# Patient Record
Sex: FEMALE | Race: WHITE | Employment: UNEMPLOYED | URBAN - METROPOLITAN AREA
[De-identification: names, ages, dates, MRNs, and addresses within clinical notes are randomized per-mention and may not be internally consistent; named-entity substitution may affect disease eponyms.]

---

## 2024-02-09 ENCOUNTER — HOSPITAL ENCOUNTER (INPATIENT)
Facility: HOSPITAL | Age: 29
LOS: 6 days | Discharge: PSYCHIATRIC OR SUBSTANCE USE FACILITY | DRG: 753 | End: 2024-02-15
Attending: PSYCHIATRY & NEUROLOGY | Admitting: PSYCHIATRY & NEUROLOGY
Payer: MEDICAID

## 2024-02-09 PROBLEM — F33.9 MAJOR DEPRESSION, RECURRENT (HCC): Status: ACTIVE | Noted: 2024-02-09

## 2024-02-09 PROBLEM — F39 UNSPECIFIED MOOD (AFFECTIVE) DISORDER (HCC): Status: ACTIVE | Noted: 2024-02-09

## 2024-02-09 PROCEDURE — 6370000000 HC RX 637 (ALT 250 FOR IP): Performed by: PSYCHIATRY & NEUROLOGY

## 2024-02-09 PROCEDURE — 1240000000 HC EMOTIONAL WELLNESS R&B

## 2024-02-09 RX ORDER — ACETAMINOPHEN 325 MG/1
650 TABLET ORAL EVERY 4 HOURS PRN
Status: DISCONTINUED | OUTPATIENT
Start: 2024-02-09 | End: 2024-02-15 | Stop reason: HOSPADM

## 2024-02-09 RX ORDER — TRAZODONE HYDROCHLORIDE 50 MG/1
50 TABLET ORAL NIGHTLY PRN
Status: DISCONTINUED | OUTPATIENT
Start: 2024-02-09 | End: 2024-02-15 | Stop reason: HOSPADM

## 2024-02-09 RX ORDER — ZIPRASIDONE MESYLATE 20 MG/ML
20 INJECTION, POWDER, LYOPHILIZED, FOR SOLUTION INTRAMUSCULAR EVERY 12 HOURS PRN
Status: DISCONTINUED | OUTPATIENT
Start: 2024-02-09 | End: 2024-02-15 | Stop reason: HOSPADM

## 2024-02-09 RX ORDER — LORAZEPAM 1 MG/1
1 TABLET ORAL EVERY 6 HOURS PRN
Status: DISCONTINUED | OUTPATIENT
Start: 2024-02-09 | End: 2024-02-09

## 2024-02-09 RX ORDER — HYDROXYZINE 50 MG/1
50 TABLET, FILM COATED ORAL 3 TIMES DAILY PRN
Status: DISCONTINUED | OUTPATIENT
Start: 2024-02-09 | End: 2024-02-15 | Stop reason: HOSPADM

## 2024-02-09 RX ORDER — LORAZEPAM 2 MG/ML
1 INJECTION INTRAMUSCULAR EVERY 6 HOURS PRN
Status: DISCONTINUED | OUTPATIENT
Start: 2024-02-09 | End: 2024-02-15 | Stop reason: HOSPADM

## 2024-02-09 RX ORDER — NICOTINE 21 MG/24HR
1 PATCH, TRANSDERMAL 24 HOURS TRANSDERMAL DAILY
Status: DISCONTINUED | OUTPATIENT
Start: 2024-02-09 | End: 2024-02-15 | Stop reason: HOSPADM

## 2024-02-09 RX ORDER — QUETIAPINE FUMARATE 50 MG/1
150 TABLET, EXTENDED RELEASE ORAL EVERY EVENING
Status: DISCONTINUED | OUTPATIENT
Start: 2024-02-09 | End: 2024-02-12

## 2024-02-09 RX ORDER — ZIPRASIDONE HYDROCHLORIDE 20 MG/1
20 CAPSULE ORAL 2 TIMES DAILY PRN
Status: DISCONTINUED | OUTPATIENT
Start: 2024-02-09 | End: 2024-02-15 | Stop reason: HOSPADM

## 2024-02-09 RX ADMIN — LORAZEPAM 1 MG: 1 TABLET ORAL at 11:07

## 2024-02-09 RX ADMIN — CARIPRAZINE 3 MG: 3 CAPSULE, GELATIN COATED ORAL at 16:38

## 2024-02-09 RX ADMIN — QUETIAPINE FUMARATE 150 MG: 50 TABLET, EXTENDED RELEASE ORAL at 17:05

## 2024-02-09 NOTE — GROUP NOTE
Group Therapy Note    Date: 2/9/2024    Group Start Time: 1315  Group End Time: 1400  Group Topic: Process Group - Inpatient    [facility]R 2 [facility]    Radha Cordoba        Group Therapy Note  Writer tried to use the anxiety ball but had to change the topic due to the acuity of the pts. Writer explained the rules but pts were not able to process and follow the instructions. Writer changed the topic of the group the the process animal activity. Pts were asked to describe their strengths and weakness compared to an animal.   Attendees: 2-5         Notes:  Pt was encouraged to attend and chose not to       Signature:  Radha Cordoba

## 2024-02-09 NOTE — GROUP NOTE
Group Therapy Note    Date: 2/9/2024    Group Start Time: 1600  Group End Time: 1640  Group Topic: Recreational    SSR 2  NON ACUTE    Cesilia Peoples        Group Therapy Note    Facilitated leisure skills group to reinforce positive coping and to manage mood through music, social interaction, group activities and art task       Attendees: 3/6       Patient's Goal:  Attend group daily     Notes:  Pt was receptive to listening to music and songs she selected. Interacted with peer and staff. Accepted journal and was noted to be writing in it for a few minutes. Pt was in and out of group       Status After Intervention:  Unchanged    Participation Level: Active Listener and Interactive    Participation Quality: Appropriate      Speech: Loud at times      Thought Process/Content: Religiously Preoccupied      Affective Functioning: Congruent      Mood: euphoric      Level of consciousness:  Alert      Response to Learning: Progressing to goal      Endings: None Reported    Modes of Intervention: Socialization and Activity      Discipline Responsible: Recreational Therapist      Signature:  Cesilia Peoples, CTRS

## 2024-02-09 NOTE — GROUP NOTE
Group Therapy Note    Date: 2/9/2024    Group Start Time: 1115  Group End Time: 1150  Group Topic: Education Group - Inpatient    SSR 2  NON ACUTE    Cesilia Peoples        Group Therapy Note    Facilitated discussion on stress exploration focused on being able to identify daily hassles, major life changes and life circumstances that contribute to stress and identify daily uplifts, healthy coping strategies and protective factors that counteract stress       Attendees: 2/6      Notes:  Encouraged but did not attend    Discipline Responsible: Recreational Therapist      Signature:  Cesilia Peoples, RIGOBERTOS

## 2024-02-10 LAB
GLUCOSE BLD STRIP.AUTO-MCNC: 102 MG/DL (ref 65–100)
PERFORMED BY:: ABNORMAL

## 2024-02-10 PROCEDURE — 6370000000 HC RX 637 (ALT 250 FOR IP): Performed by: PSYCHIATRY & NEUROLOGY

## 2024-02-10 PROCEDURE — 1240000000 HC EMOTIONAL WELLNESS R&B

## 2024-02-10 PROCEDURE — 82962 GLUCOSE BLOOD TEST: CPT

## 2024-02-10 RX ORDER — ATORVASTATIN CALCIUM 10 MG/1
10 TABLET, FILM COATED ORAL NIGHTLY
Status: DISCONTINUED | OUTPATIENT
Start: 2024-02-10 | End: 2024-02-15 | Stop reason: HOSPADM

## 2024-02-10 RX ADMIN — QUETIAPINE FUMARATE 150 MG: 50 TABLET, EXTENDED RELEASE ORAL at 17:29

## 2024-02-10 RX ADMIN — CARIPRAZINE 3 MG: 3 CAPSULE, GELATIN COATED ORAL at 09:38

## 2024-02-10 RX ADMIN — METFORMIN HYDROCHLORIDE 850 MG: 850 TABLET ORAL at 17:29

## 2024-02-10 RX ADMIN — SERTRALINE HYDROCHLORIDE 100 MG: 50 TABLET ORAL at 21:18

## 2024-02-10 RX ADMIN — ATORVASTATIN CALCIUM 10 MG: 10 TABLET, FILM COATED ORAL at 21:18

## 2024-02-10 RX ADMIN — SERTRALINE HYDROCHLORIDE 100 MG: 50 TABLET ORAL at 13:34

## 2024-02-10 NOTE — GROUP NOTE
Group Therapy Note    Date: 2/10/2024    Group Start Time: 1520  Group End Time: 1615  Group Topic: Recreational    SSR 2  NON ACUTE    eCsilia Peoples        Group Therapy Note    Facilitated leisure skills group to reinforce positive coping and to manage mood through music, social interaction, group activities and art task       Attendees: 3/9       Patient's Goal:  Attend group daily     Notes:  Pt was in and out of group. Receptive to listening to music and songs she selected while working on leisure task for a few minutes. Interacted with peers and staff       Status After Intervention:  Improved    Participation Level: Active Listener and Interactive    Participation Quality: Appropriate and Attentive      Speech:  normal      Thought Process/Content: Logical      Affective Functioning: Blunted      Mood:  Calm      Level of consciousness:  Attentive      Response to Learning: Progressing to goal      Endings: None Reported    Modes of Intervention: Socialization and Activity      Discipline Responsible: Recreational Therapist      Signature:  SHELDON Thurman

## 2024-02-10 NOTE — GROUP NOTE
Group Therapy Note    Date: 2/10/2024    Group Start Time: 1115  Group End Time: 1155  Group Topic: Education Group - Inpatient    SSR 2  NON ACUTE    Cesilia Peoples        Group Therapy Note    Facilitated discussion focus on identifying different barriers that has prevented progress and identifying ways to confront them       Attendees: 2/9       Patient's Goal:  Attend group daily     Notes:   Receptive to information discussed on different barriers and was able to share \"substance abuse, regret and unresolved grief\" as her biggest barriers and shared they prevent her from \"concentrating and keeping healthy relationship\"  Pt shared she can confront these barriers by \"smoking weed and vaping\" Pt was encouraged to engage in positive coping skills. Pt left group. Pt stated \"she wanted to go lay down for a few minutes\" Pt came back after group was over        Status After Intervention:  Improved    Participation Level: Active Listener and Interactive    Participation Quality: Appropriate, Attentive, and Sharing      Speech:  normal      Thought Process/Content: Logical      Affective Functioning: Blunted      Mood:  Calm      Level of consciousness:  Attentive      Response to Learning: Able to verbalize current knowledge/experience and Progressing to goal      Endings: None Reported    Modes of Intervention: Education and Support      Discipline Responsible: Registered Nurse      Signature:  SHELDON Thurman

## 2024-02-11 LAB
GLUCOSE BLD STRIP.AUTO-MCNC: 107 MG/DL (ref 65–100)
PERFORMED BY:: ABNORMAL

## 2024-02-11 PROCEDURE — 6370000000 HC RX 637 (ALT 250 FOR IP): Performed by: PSYCHIATRY & NEUROLOGY

## 2024-02-11 PROCEDURE — 1240000000 HC EMOTIONAL WELLNESS R&B

## 2024-02-11 PROCEDURE — 82962 GLUCOSE BLOOD TEST: CPT

## 2024-02-11 RX ADMIN — SERTRALINE HYDROCHLORIDE 100 MG: 50 TABLET ORAL at 08:24

## 2024-02-11 RX ADMIN — SERTRALINE HYDROCHLORIDE 100 MG: 50 TABLET ORAL at 20:52

## 2024-02-11 RX ADMIN — CARIPRAZINE 3 MG: 3 CAPSULE, GELATIN COATED ORAL at 08:24

## 2024-02-11 RX ADMIN — ZIPRASIDONE HYDROCHLORIDE 20 MG: 20 CAPSULE ORAL at 08:48

## 2024-02-11 RX ADMIN — METFORMIN HYDROCHLORIDE 850 MG: 850 TABLET ORAL at 18:35

## 2024-02-11 RX ADMIN — METFORMIN HYDROCHLORIDE 850 MG: 850 TABLET ORAL at 08:23

## 2024-02-11 RX ADMIN — HYDROXYZINE HYDROCHLORIDE 50 MG: 50 TABLET, FILM COATED ORAL at 08:48

## 2024-02-11 RX ADMIN — QUETIAPINE FUMARATE 150 MG: 50 TABLET, EXTENDED RELEASE ORAL at 18:35

## 2024-02-11 RX ADMIN — ATORVASTATIN CALCIUM 10 MG: 10 TABLET, FILM COATED ORAL at 20:52

## 2024-02-11 NOTE — GROUP NOTE
Group Therapy Note    Date: 2/11/2024    Group Start Time: 1123  Group End Time: 1200  Group Topic: Education Group - Inpatient    SSR 2  NON ACUTE    Cesilia Peoples        Group Therapy Note    Facilitated group to focus on understanding the importance of healthy boundaries and developing healthy boundaries to help improve relationships       Attendees: 2/9       Notes:  Did not attend. Pt was asleep    Discipline Responsible: Recreational Therapist      Signature:  SHELDON Thurman

## 2024-02-11 NOTE — CONSULTS
Consult    Subjective:     Patient is a 28 y.o. year old female past medical history of diabetes hyperlipidemia PTSD admitted to psychiatric floor because of mood disorder, patient denies any chest pain shortness of breath nausea vomiting diarrhea constipation patient on Lipitor 10 mg daily for hyperlipidemia and also take metformin 850 twice a day    Patient denies any chest pain shortness of nausea vomiting diarrhea constipation no fever no chills    History reviewed. No pertinent past medical history.   History reviewed. No pertinent surgical history.  History reviewed. No pertinent family history.   Social History     Tobacco Use    Smoking status: Every Day     Types: Cigarettes    Smokeless tobacco: Not on file   Substance Use Topics    Alcohol use: Not on file       Current Facility-Administered Medications   Medication Dose Route Frequency Provider Last Rate Last Admin    sertraline (ZOLOFT) tablet 100 mg  100 mg Oral BID Kurt Velasco MD   100 mg at 02/11/24 0824    metFORMIN (GLUCOPHAGE) tablet 850 mg  850 mg Oral BID WC Kurt Velasco MD   850 mg at 02/11/24 0823    atorvastatin (LIPITOR) tablet 10 mg  10 mg Oral Nightly Kurt Velasco MD   10 mg at 02/10/24 2118    acetaminophen (TYLENOL) tablet 650 mg  650 mg Oral Q4H PRN Denise Wright MD        hydrOXYzine HCl (ATARAX) tablet 50 mg  50 mg Oral TID PRN Denise Wright MD   50 mg at 02/11/24 0848    traZODone (DESYREL) tablet 50 mg  50 mg Oral Nightly PRN Denise Wright MD        magnesium hydroxide (MILK OF MAGNESIA) 400 MG/5ML suspension 30 mL  30 mL Oral Daily PRN Denise Wright MD        LORazepam (ATIVAN) injection 1 mg  1 mg IntraMUSCular Q6H PRN Denise Wright MD        ziprasidone (GEODON) capsule 20 mg  20 mg Oral BID PRN Denise Wright MD   20 mg at 02/11/24 0848    ziprasidone (GEODON) injection 20 mg  20 mg IntraMUSCular Q12H PRN Denise Wright MD        nicotine (NICODERM CQ) 21 MG/24HR 1 patch  1 patch TransDERmal Daily Denise Wright MD   1 patch at

## 2024-02-11 NOTE — GROUP NOTE
Group Therapy Note    Date: 2/11/2024    Group Start Time: 1505  Group End Time: 1555  Group Topic: Recreational    SSR 2 [facility] NON ACUTE    Cesilia Peopels        Group Therapy Note    Facilitated leisure skills group to reinforce positive coping and to manage mood through music, social interaction, group activities and art task       Attendees: 5/9      Notes:  Encouraged but did not attend    Discipline Responsible: Recreational Therapist      Signature:  SHELDON Thurman

## 2024-02-11 NOTE — PLAN OF CARE
Problem: Discharge Planning  Goal: Discharge to home or other facility with appropriate resources  Outcome: Not Progressing     Problem: Coping  Goal: Pt/Family able to verbalize concerns and demonstrate effective coping strategies  Description: INTERVENTIONS:  1. Assist patient/family to identify coping skills, available support systems and cultural and spiritual values  2. Provide emotional support, including active listening and acknowledgement of concerns of patient and caregivers  3. Reduce environmental stimuli, as able  4. Instruct patient/family in relaxation techniques, as appropriate  5. Assess for spiritual pain/suffering and initiate Spiritual Care, Psychosocial Clinical Specialist consults as needed  Outcome: Not Progressing     Problem: Decision Making  Goal: Pt/Family able to effectively weigh alternatives and participate in decision making related to treatment and care  Description: INTERVENTIONS:  1. Determine when there are differences between patient's view, family's view, and healthcare provider's view of condition  2. Facilitate patient and family articulation of goals for care  3. Help patient and family identify pros/cons of alternative solutions  4. Provide information as requested by patient/family  5. Respect patient/family right to receive or not to receive information  6. Serve as a liaison between patient and family and health care team  7. Initiate Consults from Ethics, Palliative Care or initiate Family Care Conference as is appropriate  Outcome: Not Progressing     Problem: Depression/Self Harm  Goal: Effect of psychiatric condition will be minimized and patient will be protected from self harm  Description: INTERVENTIONS:  1. Assess impact of patient's symptoms on level of functioning, self care needs and offer support as indicated  2. Assess patient/family knowledge of depression, impact on illness and need for teaching  3. Provide emotional support, presence and reassurance  4.

## 2024-02-12 LAB
GLUCOSE BLD STRIP.AUTO-MCNC: 97 MG/DL (ref 65–100)
PERFORMED BY:: NORMAL

## 2024-02-12 PROCEDURE — 6370000000 HC RX 637 (ALT 250 FOR IP): Performed by: PSYCHIATRY & NEUROLOGY

## 2024-02-12 PROCEDURE — 82962 GLUCOSE BLOOD TEST: CPT

## 2024-02-12 PROCEDURE — 1240000000 HC EMOTIONAL WELLNESS R&B

## 2024-02-12 RX ORDER — ARIPIPRAZOLE 2 MG/1
2 TABLET ORAL ONCE
Status: COMPLETED | OUTPATIENT
Start: 2024-02-12 | End: 2024-02-12

## 2024-02-12 RX ORDER — QUETIAPINE FUMARATE 50 MG/1
200 TABLET, EXTENDED RELEASE ORAL EVERY EVENING
Status: DISCONTINUED | OUTPATIENT
Start: 2024-02-13 | End: 2024-02-14

## 2024-02-12 RX ADMIN — METFORMIN HYDROCHLORIDE 850 MG: 850 TABLET ORAL at 17:13

## 2024-02-12 RX ADMIN — ATORVASTATIN CALCIUM 10 MG: 10 TABLET, FILM COATED ORAL at 21:01

## 2024-02-12 RX ADMIN — TRAZODONE HYDROCHLORIDE 50 MG: 50 TABLET ORAL at 21:00

## 2024-02-12 RX ADMIN — SERTRALINE HYDROCHLORIDE 100 MG: 50 TABLET ORAL at 08:24

## 2024-02-12 RX ADMIN — ARIPIPRAZOLE 2 MG: 2 TABLET ORAL at 22:57

## 2024-02-12 RX ADMIN — QUETIAPINE FUMARATE 150 MG: 50 TABLET, EXTENDED RELEASE ORAL at 17:13

## 2024-02-12 RX ADMIN — METFORMIN HYDROCHLORIDE 850 MG: 850 TABLET ORAL at 08:24

## 2024-02-12 RX ADMIN — HYDROXYZINE HYDROCHLORIDE 50 MG: 50 TABLET, FILM COATED ORAL at 21:00

## 2024-02-12 RX ADMIN — CARIPRAZINE 3 MG: 3 CAPSULE, GELATIN COATED ORAL at 09:12

## 2024-02-12 RX ADMIN — SERTRALINE HYDROCHLORIDE 100 MG: 50 TABLET ORAL at 21:01

## 2024-02-12 NOTE — GROUP NOTE
Group Therapy Note    Date: 2/12/2024    Group Start Time: 1300  Group End Time: 1335  Group Topic: Process Group - Inpatient    SSR 2 [facility]    Joellen Bradshaw        Group Therapy Note: This writer facilitated a group where the topic of \"self care\" was discussed.     Attendees: 2       Patient's Goal:  to attend groups    Notes:  Pt was able to identify that going out to get a manicure and pedicure is how she maintains self care.     Status After Intervention:  Improved    Participation Level: Active Listener and Interactive    Participation Quality: Appropriate, Attentive, and Sharing      Speech:  normal      Thought Process/Content: Logical      Affective Functioning: Congruent      Mood: calm      Level of consciousness:  Alert and Oriented x4      Response to Learning: Able to verbalize current knowledge/experience, Able to verbalize/acknowledge new learning, Able to retain information, Capable of insight, and Able to change behavior      Endings: None Reported    Modes of Intervention: Education and Support      Discipline Responsible: /Counselor        Signature:  Joellen Bradshaw

## 2024-02-12 NOTE — GROUP NOTE
Group Therapy Note    Date: 2/12/2024    Group Start Time: 1115  Group End Time: 1200  Group Topic: Process Group - Inpatient    [facility] 2 [facility]    Radha Cordoba        Group Therapy Note  Process group was focused on substance use. Writer was going to speak about family relationships but pts shared that substance use was a common factor they were facing. Pts interacted well with one another supporting each other. Pt provided positive feedback and challenge their thoughts and encouraged them to not be hard on themselves.   Attendees: 4-8         Notes:  Pt was on the Acute unit during group         Signature:  Radha Cordoba

## 2024-02-12 NOTE — GROUP NOTE
Group Therapy Note    Date: 2/12/2024    Group Start Time: 1125  Group End Time: 1155  Group Topic: Education Group - Inpatient    SSR 2  NON ACUTE    Cesilia Peoples        Group Therapy Note    Facilitated group to introduce the definition of self-esteem and discuss information relating to creating steps to greater self-appreciation and recognizing symptoms of self-defeat       Attendees: 4/10       Patient's Goal:  Client will learn and demonstrate effective coping skills    Notes:  Receptive to information discussed and engaged. Pt was able to recognize symptoms and identified some personal symptoms of self-defeat and was able to share positive ways to boost her self-esteem     Status After Intervention:  Improved    Participation Level: Active Listener and Interactive    Participation Quality: Appropriate, Attentive, and Sharing      Speech:  normal      Thought Process/Content: Logical      Affective Functioning: Congruent      Mood:  Calm      Level of consciousness:  Attentive      Response to Learning: Able to verbalize current knowledge/experience and Progressing to goal      Endings: None Reported    Modes of Intervention: Education and Support      Discipline Responsible: Recreational Therapist      Signature:  SHELDON Thurman

## 2024-02-12 NOTE — CARE COORDINATION
02/12/24 1415   ITP   Date of Plan 02/12/24   Date of Next Review 02/19/24   Primary Diagnosis Code Unspecified mood (affective) disorder (HCC) F39   Barriers to Treatment Need for psychoeducation   Strengths Incorporated in Plan Acknowledging need for assistance   Plan of Care   Long Term Goal (LTG) Stated in patient/guardian terms \"not to end up like my mom\"   Short Term Goal 1   Short Term Goal 1 Client will be oriented to program and staff, and participate in assessment process   Baseline Functioning to make the individual comfortable with the environment   Target the individual will be able to approach staff and voice appropriate needs   Objectives Client will participate in individual therapy;Client will participate in group therapy   Intervention 1 Assess safety   Frequency daily   Measured by Self report;Staff observation   Staff Responsible Clinical staff;[facility] staff   Intervention 2 Acknowledge client strengths   Frequency daily   Measured by Staff observation;Self report   Staff Responsible Clinical staff;[facility] staff   Intervention 3 Group therapy   Frequency daily   Measured by Staff observation;Self report   Staff Responsible Clinical staff;[facility] staff   STG Goal 1 Status: Patient Appears to be  Progressing toward treatment plan goal   Short Term Goal 2   Short Term Goal 2 Client will learn and demonstrate effective coping skills   Baseline Functioning to improve the over all quality of life   Target the individual will be able to use positive skills to deal with life stressors   Objectives Client will participate in group therapy;Client will participate in individual therapy   Intervention 1 Indvidual therapy   Frequency daily   Measured by Self report;Staff observation   Staff Responsible Clinical staff;[facility] staff   Intervention 2 Milieu therapy and support   Frequency daily   Measured by Staff observation;Self report   Staff Responsible Clinical staff;[facility] staff   Intervention 3 Monitor medications

## 2024-02-12 NOTE — GROUP NOTE
Group Therapy Note    Date: 2/12/2024    Group Start Time: 1555  Group End Time: 1645  Group Topic: Recreational    SSR 2  NON ACUTE    Cesilia Peoples        Group Therapy Note    Facilitated leisure skills group to reinforce positive coping and to manage mood through music, social interaction, group activities and art task       Attendees: 6/11       Patient's Goal:  Client will learn and demonstrate effective coping skills    Notes:  Pt was receptive to listening to music and songs she selected. Interacted with peer and staff. Declined to work on leisure task      Status After Intervention:  Improved    Participation Level: Active Listener and Interactive    Participation Quality: Appropriate      Speech:  normal      Thought Process/Content: Logical      Affective Functioning: Congruent      Mood:  Calm      Level of consciousness:  Alert      Response to Learning: Progressing to goal      Endings: None Reported    Modes of Intervention: Socialization and Activity      Discipline Responsible: Recreational Therapist      Signature:  SHELDON Thurman

## 2024-02-13 LAB
GLUCOSE BLD STRIP.AUTO-MCNC: 89 MG/DL (ref 65–100)
PERFORMED BY:: NORMAL

## 2024-02-13 PROCEDURE — 6370000000 HC RX 637 (ALT 250 FOR IP): Performed by: PSYCHIATRY & NEUROLOGY

## 2024-02-13 PROCEDURE — 1240000000 HC EMOTIONAL WELLNESS R&B

## 2024-02-13 PROCEDURE — 82962 GLUCOSE BLOOD TEST: CPT

## 2024-02-13 RX ORDER — MAGNESIUM HYDROXIDE/ALUMINUM HYDROXICE/SIMETHICONE 120; 1200; 1200 MG/30ML; MG/30ML; MG/30ML
30 SUSPENSION ORAL EVERY 6 HOURS PRN
Status: DISCONTINUED | OUTPATIENT
Start: 2024-02-13 | End: 2024-02-15 | Stop reason: HOSPADM

## 2024-02-13 RX ADMIN — TRAZODONE HYDROCHLORIDE 50 MG: 50 TABLET ORAL at 20:58

## 2024-02-13 RX ADMIN — SERTRALINE HYDROCHLORIDE 100 MG: 50 TABLET ORAL at 08:04

## 2024-02-13 RX ADMIN — ATORVASTATIN CALCIUM 10 MG: 10 TABLET, FILM COATED ORAL at 20:57

## 2024-02-13 RX ADMIN — CARIPRAZINE 3 MG: 3 CAPSULE, GELATIN COATED ORAL at 08:11

## 2024-02-13 RX ADMIN — QUETIAPINE FUMARATE 200 MG: 50 TABLET, EXTENDED RELEASE ORAL at 17:05

## 2024-02-13 RX ADMIN — SERTRALINE HYDROCHLORIDE 100 MG: 50 TABLET ORAL at 20:58

## 2024-02-13 RX ADMIN — METFORMIN HYDROCHLORIDE 850 MG: 850 TABLET ORAL at 08:04

## 2024-02-13 RX ADMIN — METFORMIN HYDROCHLORIDE 850 MG: 850 TABLET ORAL at 17:05

## 2024-02-13 RX ADMIN — HYDROXYZINE HYDROCHLORIDE 50 MG: 50 TABLET, FILM COATED ORAL at 20:58

## 2024-02-13 NOTE — GROUP NOTE
Group Therapy Note    Date: 2/13/2024    Group Start Time: 1315  Group End Time: 1400  Group Topic: Process Group - Inpatient    SSR 2 BE[facility] ACUTE    Radha Cordoba        Group Therapy Note  Process group was focused on relationships. Writer had the pts pick a number [phone] and asked various questions about relationships. Writer asked the pts to describe their life using a movie. Pts interacted well with one another and provided positive feedback to others.   Attendees: 5-7       Patient's Goal:  \"to get out of here\"    Notes:  Pt interacted well with others and at times would get off topic. Pts thoughts were tangential and disorganized at times. Writer was able to redirected easily and was pleasant when asked to let others speak as well. Pts movie would be called \"the black sheep of the family\" because she feels like the black sheep with problems.     Status After Intervention:  Improved    Participation Level: Active Listener and Interactive    Participation Quality: Appropriate, Attentive, and Sharing      Speech:  normal      Thought Process/Content: Flight of ideas      Affective Functioning: Congruent      Mood:  depressed       Level of consciousness:  Alert, Oriented x4, and Attentive      Response to Learning: Able to verbalize current knowledge/experience, Able to change behavior, and Progressing to goal      Endings: None Reported    Modes of Intervention: Support, Exploration, Problem-solving, and Limit-setting      Discipline Responsible: /Counselor      Signature:  Radha Cordoba

## 2024-02-14 PROCEDURE — 6360000002 HC RX W HCPCS

## 2024-02-14 PROCEDURE — 6360000002 HC RX W HCPCS: Performed by: PSYCHIATRY & NEUROLOGY

## 2024-02-14 PROCEDURE — 6370000000 HC RX 637 (ALT 250 FOR IP): Performed by: PSYCHIATRY & NEUROLOGY

## 2024-02-14 PROCEDURE — 1240000000 HC EMOTIONAL WELLNESS R&B

## 2024-02-14 RX ORDER — QUETIAPINE 150 MG/1
300 TABLET, FILM COATED, EXTENDED RELEASE ORAL EVERY EVENING
Status: DISCONTINUED | OUTPATIENT
Start: 2024-02-14 | End: 2024-02-15 | Stop reason: HOSPADM

## 2024-02-14 RX ADMIN — ATORVASTATIN CALCIUM 10 MG: 10 TABLET, FILM COATED ORAL at 20:34

## 2024-02-14 RX ADMIN — SERTRALINE HYDROCHLORIDE 100 MG: 50 TABLET ORAL at 20:34

## 2024-02-14 RX ADMIN — ARIPIPRAZOLE 400 MG: KIT at 09:13

## 2024-02-14 RX ADMIN — TRAZODONE HYDROCHLORIDE 50 MG: 50 TABLET ORAL at 20:34

## 2024-02-14 RX ADMIN — SERTRALINE HYDROCHLORIDE 100 MG: 50 TABLET ORAL at 08:12

## 2024-02-14 RX ADMIN — HYDROXYZINE HYDROCHLORIDE 50 MG: 50 TABLET, FILM COATED ORAL at 20:34

## 2024-02-14 RX ADMIN — QUETIAPINE FUMARATE 300 MG: 150 TABLET, EXTENDED RELEASE ORAL at 18:00

## 2024-02-14 RX ADMIN — CARIPRAZINE 3 MG: 3 CAPSULE, GELATIN COATED ORAL at 08:23

## 2024-02-14 RX ADMIN — METFORMIN HYDROCHLORIDE 850 MG: 850 TABLET ORAL at 08:12

## 2024-02-14 RX ADMIN — METFORMIN HYDROCHLORIDE 850 MG: 850 TABLET ORAL at 17:06

## 2024-02-14 NOTE — GROUP NOTE
Group Therapy Note    Date: 2/13/2024    Group Start Time: 1845  Group End Time: 1940  Group Topic: Education Group - Inpatient    SSR 2  NON ACUTE    Cady Langston        Group Therapy Note    Attendees: 6/7    Recreational Therapist facilitated structured leisure skills group to introduce healthy leisure skills as positive way to cope and manage mood.         Patient's Goal:  Client will learn and demonstrate effective coping skills     Notes:  Attended group and listened to songs with peers.  Pt was receptive to intervention and responded to prompts from staff.     Status After Intervention:  Improved    Participation Level: Active Listener    Participation Quality: Appropriate      Speech:  normal      Thought Process/Content: Logical      Affective Functioning: Congruent      Mood:  calm       Level of consciousness:  Alert      Response to Learning: Progressing to goal      Endings: None Reported    Modes of Intervention: Activity      Discipline Responsible: Recreational Therapist      Signature:  SHELDON Rodriguez

## 2024-02-14 NOTE — PLAN OF CARE
Problem: Discharge Planning  Goal: Discharge to home or other facility with appropriate resources  Outcome: Not Progressing     Problem: Coping  Goal: Pt/Family able to verbalize concerns and demonstrate effective coping strategies  Description: INTERVENTIONS:  1. Assist patient/family to identify coping skills, available support systems and cultural and spiritual values  2. Provide emotional support, including active listening and acknowledgement of concerns of patient and caregivers  3. Reduce environmental stimuli, as able  4. Instruct patient/family in relaxation techniques, as appropriate  5. Assess for spiritual pain/suffering and initiate Spiritual Care, Psychosocial Clinical Specialist consults as needed  Outcome: Not Progressing     Problem: Decision Making  Goal: Pt/Family able to effectively weigh alternatives and participate in decision making related to treatment and care  Description: INTERVENTIONS:  1. Determine when there are differences between patient's view, family's view, and healthcare provider's view of condition  2. Facilitate patient and family articulation of goals for care  3. Help patient and family identify pros/cons of alternative solutions  4. Provide information as requested by patient/family  5. Respect patient/family right to receive or not to receive information  6. Serve as a liaison between patient and family and health care team  7. Initiate Consults from Ethics, Palliative Care or initiate Family Care Conference as is appropriate  Outcome: Not Progressing     Problem: Confusion  Goal: Confusion, delirium, dementia, or psychosis is improved or at baseline  Description: INTERVENTIONS:  1. Assess for possible contributors to thought disturbance, including medications, impaired vision or hearing, underlying metabolic abnormalities, dehydration, psychiatric diagnoses, and notify attending LIP  2. [place] high risk fall precautions, as indicated  3. Provide frequent short contacts

## 2024-02-14 NOTE — GROUP NOTE
Group Therapy Note    Date: 2/14/2024    Group Start Time: 1100  Group End Time: 1145  Group Topic: Process Group - Inpatient    SSR 2 BEHA [facility] ACUTE    Joellen Bradshaw        Group Therapy Note: This writer facilitated a group where the emotion of anxiety was discussed along with it's triggers and coping skills. After the conversation the \"anxiety ball\" was passed around to discuss the emotion further.    Attendees: 5       Patient's Goal:  to attend groups    Notes:  Pt able to identify that when she does not have support or someone to talk to, she gets anxious. Pt stated \"I like to smoke weed to help me with my anxiety, I mean my whole family does it\"    Status After Intervention:  Improved    Participation Level: Active Listener and Interactive    Participation Quality: Appropriate, Attentive, Sharing, and Supportive      Speech:  normal      Thought Process/Content: Logical  Linear      Affective Functioning: Congruent      Mood: calm      Level of consciousness:  Alert, Oriented x4, and Attentive      Response to Learning: Able to verbalize current knowledge/experience, Able to verbalize/acknowledge new learning, Able to retain information, Capable of insight, and Able to change behavior      Endings: None Reported    Modes of Intervention: Education and Support      Discipline Responsible: /Counselor      Signature:  Joellen Bradshaw

## 2024-02-15 VITALS
HEIGHT: 65 IN | HEART RATE: 91 BPM | TEMPERATURE: 97.9 F | RESPIRATION RATE: 20 BRPM | WEIGHT: 293 LBS | OXYGEN SATURATION: 99 % | DIASTOLIC BLOOD PRESSURE: 77 MMHG | BODY MASS INDEX: 48.82 KG/M2 | SYSTOLIC BLOOD PRESSURE: 144 MMHG

## 2024-02-15 LAB
GLUCOSE BLD STRIP.AUTO-MCNC: 124 MG/DL (ref 65–100)
PERFORMED BY:: ABNORMAL

## 2024-02-15 PROCEDURE — 82962 GLUCOSE BLOOD TEST: CPT

## 2024-02-15 PROCEDURE — 6370000000 HC RX 637 (ALT 250 FOR IP): Performed by: PSYCHIATRY & NEUROLOGY

## 2024-02-15 RX ORDER — QUETIAPINE 300 MG/1
300 TABLET, FILM COATED, EXTENDED RELEASE ORAL EVERY EVENING
Qty: 30 TABLET | Refills: 1 | Status: SHIPPED | OUTPATIENT
Start: 2024-02-15

## 2024-02-15 RX ORDER — SERTRALINE HYDROCHLORIDE 100 MG/1
100 TABLET, FILM COATED ORAL 2 TIMES DAILY
Qty: 30 TABLET | Refills: 1 | Status: SHIPPED | OUTPATIENT
Start: 2024-02-15

## 2024-02-15 RX ORDER — TRAZODONE HYDROCHLORIDE 50 MG/1
50 TABLET ORAL NIGHTLY PRN
Qty: 30 TABLET | Refills: 1 | Status: SHIPPED | OUTPATIENT
Start: 2024-02-15

## 2024-02-15 RX ORDER — ATORVASTATIN CALCIUM 10 MG/1
10 TABLET, FILM COATED ORAL NIGHTLY
Qty: 30 TABLET | Refills: 1 | Status: SHIPPED | OUTPATIENT
Start: 2024-02-15

## 2024-02-15 RX ADMIN — CARIPRAZINE 3 MG: 3 CAPSULE, GELATIN COATED ORAL at 08:32

## 2024-02-15 RX ADMIN — SERTRALINE HYDROCHLORIDE 100 MG: 50 TABLET ORAL at 08:32

## 2024-02-15 RX ADMIN — METFORMIN HYDROCHLORIDE 850 MG: 850 TABLET ORAL at 08:32

## 2024-02-15 NOTE — GROUP NOTE
Group Therapy Note    Date: 2/15/2024    Group Start Time: 1335  Group End Time: 1415  Group Topic: Recreational    SSR 2 [facility] NON ACUTE    Cesilia Peoples        Group Therapy Note    Facilitated leisure skills group to reinforce positive coping and to manage mood through music, social interaction, group activities and art task      Attendees: 2/4      Notes: Did not attend. Was preparing to discharge    Discipline Responsible: Recreational Therapist      Signature:  RIGOBERTO ThurmanS

## 2024-02-15 NOTE — PROGRESS NOTES
responded to spiritual consult. Pt approached  while  was on the unit for a spiritual health visit. Pt is in good spirits, smiles throughout the visit. Pt acknowledges she was pleased with previous spiritual health visit. Pt shared in life review/joyful and painful storytelling. Pt discusses complicated familial and romantic relationships. Pt admits she smiles a lot and believes that laughter is better than crying. Pt expresses her appreciation for the visit.    Please contact Spiritual Health for any emotional/spiritual needs and/or further referrals. Thank you.    Rev. Sabrina Smith MDIV   can be reached by calling the  at [facility]  [phone]     
PSYCHIATRIC PROGRESS NOTE         Patient Name  Zeynep Sparks   Date of Birth 1995   [facility] [id number]   Medical Record Number  [id number]      Age  28 y.o.   PCP None, None   Admit date:  2/9/2024    Room Number  235/01   [facility]   Date of Service  2/10/2024            HISTORY OF PRESENT ILLNESS/INTERVAL HISTORY:              MENTAL STATUS EXAM & VITALS                    VITALS:     Patient Vitals for the past 24 hrs:   Temp Pulse Resp BP SpO2   02/10/24 2107 97.1 °F (36.2 °C) 82 18 120/89 --   02/10/24 0701 97.7 °F (36.5 °C) 82 18 120/89 98 %     Wt Readings from Last 3 Encounters:   02/09/24 136.1 kg (300 lb)     Temp Readings from Last 3 Encounters:   02/10/24 97.1 °F (36.2 °C) (Oral)     BP Readings from Last 3 Encounters:   02/10/24 120/89     Pulse Readings from Last 3 Encounters:   02/10/24 82            DATA     LABORATORY DATA:(reviewed/updated 2/10/2024)  Recent Results (from the past 24 hour(s))   POCT Glucose    Collection Time: 02/10/24  4:25 PM   Result Value Ref Range    POC Glucose 102 (H) 65 - 100 mg/dL    Performed by: Michael Pelayo       No results found for: \"VALAC\", \"VALP\", \"CARB2\"  No results found for: \"LITHM\"   RADIOLOGY REPORTS:(reviewed/updated 2/10/2024)  No results found.       MEDICATIONS     ALL MEDICATIONS:   Current Facility-Administered Medications   Medication Dose Route Frequency   • sertraline (ZOLOFT) tablet 100 mg  100 mg Oral BID   • metFORMIN (GLUCOPHAGE) tablet 850 mg  850 mg Oral BID WC   • atorvastatin (LIPITOR) tablet 10 mg  10 mg Oral Nightly   • acetaminophen (TYLENOL) tablet 650 mg  650 mg Oral Q4H PRN   • hydrOXYzine HCl (ATARAX) tablet 50 mg  50 mg Oral TID PRN   • traZODone (DESYREL) tablet 50 mg  50 mg Oral Nightly PRN   • magnesium hydroxide (MILK OF MAGNESIA) 400 MG/5ML suspension 30 mL  30 mL Oral Daily PRN   • LORazepam (ATIVAN) injection 1 mg  1 mg IntraMUSCular Q6H PRN   • ziprasidone (GEODON) capsule 20 mg  20 mg Oral BID PRN 
PSYCHIATRIC PROGRESS NOTE         Patient Name  Zeynep Sparks   Date of Birth 1995   [facility] [id number]   Medical Record Number  [id number]      Age  28 y.o.   PCP None, None   Admit date:  2/9/2024    Room Number  233/02   [facility]   Date of Service  2/14/2024            HISTORY OF PRESENT ILLNESS/INTERVAL HISTORY:              MENTAL STATUS EXAM & VITALS                    VITALS:     Patient Vitals for the past 24 hrs:   Temp Pulse Resp BP   02/13/24 1912 98.2 °F (36.8 °C) 76 18 108/70   02/13/24 1900 98.2 °F (36.8 °C) 76 18 108/70   02/13/24 0801 97.3 °F (36.3 °C) 82 18 121/75     Wt Readings from Last 3 Encounters:   02/09/24 136.1 kg (300 lb)     Temp Readings from Last 3 Encounters:   02/13/24 98.2 °F (36.8 °C) (Oral)     BP Readings from Last 3 Encounters:   02/13/24 108/70     Pulse Readings from Last 3 Encounters:   02/13/24 76            DATA     LABORATORY DATA:(reviewed/updated 2/14/2024)  Recent Results (from the past 24 hour(s))   POCT Glucose    Collection Time: 02/13/24  6:41 AM   Result Value Ref Range    POC Glucose 89 65 - 100 mg/dL    Performed by: Mike Vuong       No results found for: \"VALAC\", \"VALP\", \"CARB2\"  No results found for: \"LITHM\"   RADIOLOGY REPORTS:(reviewed/updated 2/14/2024)  No results found.       MEDICATIONS     ALL MEDICATIONS:   Current Facility-Administered Medications   Medication Dose Route Frequency    aluminum & magnesium hydroxide-simethicone (MAALOX) 200-200-20 MG/5ML suspension 30 mL  30 mL Oral Q6H PRN    ARIPiprazole ER (ABILIFY MAINTENA) injection 400 mg  400 mg IntraMUSCular Once    QUEtiapine (SEROQUEL XR) extended release tablet 200 mg  200 mg Oral QPM    cariprazine hcl (VRAYLAR) capsule 3 mg  3 mg Oral Daily    sertraline (ZOLOFT) tablet 100 mg  100 mg Oral BID    metFORMIN (GLUCOPHAGE) tablet 850 mg  850 mg Oral BID WC    atorvastatin (LIPITOR) tablet 10 mg  10 mg Oral Nightly    acetaminophen (TYLENOL) tablet 650 mg  650 mg Oral 
PSYCHIATRIC PROGRESS NOTE         Patient Name  Zeynep Sparks   Date of Birth 1995   [facility] [id number]   Medical Record Number  [id number]      Age  28 y.o.   PCP None, None   Admit date:  2/9/2024    Room Number  235/01   [facility]   Date of Service  2/11/2024            HISTORY OF PRESENT ILLNESS/INTERVAL HISTORY:              MENTAL STATUS EXAM & VITALS                    VITALS:     Patient Vitals for the past 24 hrs:   Temp Pulse Resp BP   02/11/24 1908 98.3 °F (36.8 °C) 92 18 136/87   02/11/24 0811 97.9 °F (36.6 °C) 88 18 138/70     Wt Readings from Last 3 Encounters:   02/09/24 136.1 kg (300 lb)     Temp Readings from Last 3 Encounters:   02/11/24 98.3 °F (36.8 °C) (Oral)     BP Readings from Last 3 Encounters:   02/11/24 136/87     Pulse Readings from Last 3 Encounters:   02/11/24 92            DATA     LABORATORY DATA:(reviewed/updated 2/11/2024)  Recent Results (from the past 24 hour(s))   POCT Glucose    Collection Time: 02/11/24  6:34 AM   Result Value Ref Range    POC Glucose 107 (H) 65 - 100 mg/dL    Performed by: FELICIANO PRITCHARD       No results found for: \"VALAC\", \"VALP\", \"CARB2\"  No results found for: \"LITHM\"   RADIOLOGY REPORTS:(reviewed/updated 2/11/2024)  No results found.       MEDICATIONS     ALL MEDICATIONS:   Current Facility-Administered Medications   Medication Dose Route Frequency   • sertraline (ZOLOFT) tablet 100 mg  100 mg Oral BID   • metFORMIN (GLUCOPHAGE) tablet 850 mg  850 mg Oral BID WC   • atorvastatin (LIPITOR) tablet 10 mg  10 mg Oral Nightly   • acetaminophen (TYLENOL) tablet 650 mg  650 mg Oral Q4H PRN   • hydrOXYzine HCl (ATARAX) tablet 50 mg  50 mg Oral TID PRN   • traZODone (DESYREL) tablet 50 mg  50 mg Oral Nightly PRN   • magnesium hydroxide (MILK OF MAGNESIA) 400 MG/5ML suspension 30 mL  30 mL Oral Daily PRN   • LORazepam (ATIVAN) injection 1 mg  1 mg IntraMUSCular Q6H PRN   • ziprasidone (GEODON) capsule 20 mg  20 mg Oral BID PRN   • ziprasidone 
Progress Note  Date:2024       Room:[facility]  Patient Name:Zeynep Sparks     YOB: 1995     Age:28 y.o.        Subjective    Subjective patient still presents hypomanic with flight of ideas, hyperverbal.  Some decrease in her manic symptoms and delusional thoughts.    Mental status examination-patient presents more coherent in conversation decrease in patsy and delusional statements.  Still presents with flight of ideas, hypomanic related mood.  Denies having any suicidal or homicidal feelings.  No command hallucinations reported.  Insight judgment coping remains limited.  Review of Systems  Objective         Vitals Last 24 Hours:  TEMPERATURE:  Temp  Av.8 °F (37.1 °C)  Min: 98.2 °F (36.8 °C)  Max: 99.3 °F (37.4 °C)  RESPIRATIONS RANGE: Resp  Av  Min: 18  Max: 20  PULSE OXIMETRY RANGE: No data recorded  PULSE RANGE: Pulse  Av.5  Min: 91  Max: 94  BLOOD PRESSURE RANGE: Systolic (24hrs), Av , Min:118 , Max:141   ; Diastolic (24hrs), Av, Min:92, Max:97    I/O (24Hr):  No intake or output data in the 24 hours ending 24  Objective  Labs/Imaging/Diagnostics    Labs:  CBC:No results for input(s): \"WBC\", \"RBC\", \"HGB\", \"HCT\", \"MCV\", \"RDW\", \"PLT\" in the last 72 hours.  CHEMISTRIES:No results for input(s): \"NA\", \"K\", \"CL\", \"CO2\", \"BUN\", \"CREATININE\", \"GLUCOSE\", \"CA\", \"PHOS\", \"MG\" in the last 72 hours.  PT/INR:No results for input(s): \"PROTIME\", \"INR\" in the last 72 hours.  APTT:No results for input(s): \"APTT\" in the last 72 hours.  LIVER PROFILE:No results for input(s): \"AST\", \"ALT\", \"BILIDIR\", \"BILITOT\", \"ALKPHOS\" in the last 72 hours.    Imaging Last 24 Hours:  No results found.  Assessment//Plan           Hospital Problems             Last Modified POA    * (Principal) Unspecified mood (affective) disorder (HCC) 2024 Yes    Major depression, recurrent (HCC) 2024 Yes     Assessment & Plan  Patient agreeable and considering long acting injection.  Abilify 2 
Spiritual Care Assessment/Progress Note  [facility]    Name: Zeynep Sparks MRN: [id number]    Age: 28 y.o.     Sex: female   Language: English     Date: 2/10/2024            Total Time Calculated: 33 min              Spiritual Assessment begun in [facility] 2 [facility] ACUTE  Service Provided For:: Patient  Referral/Consult From:: Nurse  Encounter Overview/Reason : Initial Encounter, Spiritual/Emotional Needs    Spiritual beliefs:      [x] Involved in a melchor tradition/spiritual practice: Church/Latter-day     [x] Supported by a melchor community:      [] Claims no spiritual orientation:      [] Seeking spiritual identity:           [] Adheres to an individual form of spirituality:      [] Not able to assess:                Identified resources for coping and support system:   Support System: Family members       [x] Prayer                  [] Devotional reading               [] Music                  [] Guided Imagery     [] Pet visits                                        [] Other: (COMMENT)     Specific area/focus of visit   Encounter:    Crisis:    Spiritual/Emotional needs: Type: Spiritual Support, Emotional Distress  Ritual, Rites and Sacraments:    Grief, Loss, and Adjustments:    Ethics/Mediation:    Behavioral Health:    Palliative Care:    Advance Care Planning:      Plan/Referrals: Other (Comment) ( available if needed.)    Narrative:   Visited patient in room #235. Patient was present. She was lying in her bed during our visit. She shared about her melchor journey with me. She also shared about her family of origin and lack of family support. Provided patient with support by presence, reflective listening, and words of encouragement. Prayed with her per request. Advised of  availability.      Smiley Rodriguez DMin.    can be reached by calling  at [facility] [phone]   
treatment furnished directly by or requiring the supervision of inpatient psychiatric facility personnel. In addition, the hospital records show that services furnished were intensive treatment services.    Signed By:   Kurt Velasco MD  2/14/2024

## 2024-02-15 NOTE — DISCHARGE SUMMARY
Abnormal; Notable for the following components:    POC Glucose 124 (*)     All other components within normal limits   POCT GLUCOSE   POCT GLUCOSE     No results found for: \"PHEN\", \"PHENO\", \"PHENY\", \"PTN\", \"VALAC\", \"VALP\", \"CARB2\"  Admission on 02/09/2024   Component Date Value Ref Range Status    POC Glucose 02/10/2024 102 (H)  65 - 100 mg/dL Final    Performed by: 02/10/2024 Michael Pelayo   Final    POC Glucose 02/11/2024 107 (H)  65 - 100 mg/dL Final    Performed by: 02/11/2024 FELICIANO FRANCHESKA   Final    POC Glucose 02/12/2024 97  65 - 100 mg/dL Final    Performed by: 02/12/2024 FELICIANO FRANCHESKA   Final    POC Glucose 02/13/2024 89  65 - 100 mg/dL Final    Performed by: 02/13/2024 Mike Vuong   Final    POC Glucose 02/15/2024 124 (H)  65 - 100 mg/dL Final    Performed by: 02/15/2024 Michael Gita   Final     No results found.                DISPOSITION:    Patient to f/u with psychiatric and psychotherapy appointments.              FOLLOW-UP CARE:    activity as tolerated  regular diet  Wound Care: none needed.  [facility]  Address: [address] [place] [address]  Phone: [phone]  Go on 2/28/2024  Follow up at 9:00am on 2/28 with the above agency to establish providers after hospital discharge.    [facility]  Address: [address] [place] [id number]  Phone: [phone]  Go on 2/22/2024  Follow up with the above resource for support after discharge on 2/22 at 10:00. Your initial intake appointment will last appproxmatly 3 hrs, please bring something to drink and a snack. Please bring all your current meds to the appointment in their original precripiton bottles. No children and visitors will be allowing during the session, adults may wait in the lobby or their personal vehicle. If you have any transportation concerns, please reach out to the above number as soon as possible afte discharge for assistance. Please arrive 15-20 min early with your ID and insurace

## 2024-02-15 NOTE — GROUP NOTE
Group Therapy Note    Date: 2/14/2024    Group Start Time: 1845  Group End Time: 1930  Group Topic: Recreational    [facility] 2 [facility] NON ACUTE    Cady Langston        Group Therapy Note    Attendees: 5/6     Recreational Therapist facilitated structured leisure skills group to introduce healthy leisure skills as positive way to cope and manage mood.        Patient's Goal:  Client will learn and demonstrate effective coping skills     Notes:  Attended group and listened to songs with peers.  Pt was receptive to intervention and responded to prompts from staff.     Status After Intervention:  Improved    Participation Level: Active Listener    Participation Quality: Appropriate      Speech:  normal      Thought Process/Content: Logical      Affective Functioning: Congruent      Mood:  calm       Level of consciousness:  Alert      Response to Learning: Progressing to goal      Endings: None Reported    Modes of Intervention: Activity      Discipline Responsible: Recreational Therapist      Signature:  SHELDON Rodriguez

## 2024-02-15 NOTE — BH NOTE
Alert and oriented x 4. Affect and mood are anxious and impatient. Denies SI/HI/AVH at this time. Thought processes are appropriate. Pt voices readiness and desire for discharge home. Medication and meal compliant. Pt continues to c/o feeling flushed and at times sweaty.Vital signs are stable. Visible on unit, interacting well with peers.   
Alert and oriented x 4. Affect and mood are bright and smiling. Denies SI/HI/AVH at this time. Medication and meal compliant. Interacting well with peers on unit. Pt received Abilify Maintena 400 mg IM in Left hip. Tolerated injection without issue.   
Behavioral Health Transition Record to Provider    Patient Name: Zeynep Sparks  YOB: 1995  Medical Record Number: [id number]  Date of Admission: 2024  Date of Discharge: 2/15/2024    Attending Provider: Denise Wright MD  Discharging Provider: Dr Wright  To contact this individual call  and ask the  to page.  If unavailable, ask to be transferred to Behavioral Health Provider on call.  A Behavioral Health Provider will be available on call  and during holidays.    Primary Care Provider: None, None    No Known Allergies    Reason for Admission: Patient was compliant with signing admission paperwork. On assessment, patient was hyperverbal, religiously preoccupied, endorsed 3-4 hours of sleep/night. Disorganized. Talks about having PTSD, from finding her father dead. States her father  2010, \"I was born on the date of his death\". Disorganized history of people and places, talking about the  Julian, who was the same person as the  who brought her from [place]. Repeated thought process \"They are rich, I am poor\". Daughter Teresa, being cared for by \"Lesley\", who worked at the prison where Teresa's father was incarcerated for meth, but now he's out. \"Lesley manipulated my daughter, manipulated her into a dog\".        Admission Diagnosis: Unspecified mood (affective) disorder (HCC) [F39]  Major depression, recurrent (HCC) [F33.9]    * No surgery found *    Results for orders placed or performed during the hospital encounter of 24   POCT Glucose   Result Value Ref Range    POC Glucose 102 (H) 65 - 100 mg/dL    Performed by: Michael Pelayo    POCT Glucose   Result Value Ref Range    POC Glucose 107 (H) 65 - 100 mg/dL    Performed by: FELICIANO PRITCHARD    POCT Glucose   Result Value Ref Range    POC Glucose 97 65 - 100 mg/dL    Performed by: FELICIANO PRITCHARD    POCT Glucose   Result Value Ref Range    POC Glucose 89 65 - 100 mg/dL    
Behavioral Health Treatment Team Note     Patient goal(s) for today: \"go home\"  Treatment team focus/goals: meds management, dc planning, group therapy    Progress note: Pt was seen in the group room after group session since she requested to speak to this writer. Pt presented to be calm, cooperative, alert, oriented, with a bright affect. Pt denied current si/hi/ah/vh. Pt stated that \"the meds are really helping me, I feel much calmer, my mind is not racing anymore and I am able to sleep at night and I want to eat food again, I have found my appetite\" Pt voiced that at this point she feels comfortable with dc and follow up with outpt providers. Pt did present to be tearful and voiced disappointment over her daughter/ed wanting to stay with her grandmother and \"I just don't want her life to ever be messed up, I don't want her to end up like me, I want everything that is good for her in life\" Pt provided with encouragement and voiced understanding. Pt also able to identify that \"I was really manic before I came here but I am in a better place now but I know that I had a connection with God last year but no one believes me\" Pt did not voice any other concerns and stated that she was grateful for to the staff since \"they helped me so much\" Pt cont to meet criteria for inpt stay for further stabilization through meds management.     LOS:  5  Expected LOS: 5-7    Insurance info/prescription coverage:  [place] [employer]   Date of last family contact:  pt has signed a eligio in mr lovell's name/fiance  on 2/12   Family requesting physician contact today:  No  Discharge plan:  to stabilize pt  Guns in the home:  No   [facility]    Participating treatment team members: Zeynep Sparks, * (assigned SW), Joellen Bradshaw, MS  
Behavioral Health Treatment Team Note     Patient goal(s) for today: \"go home\"  Treatment team focus/goals: meds management, dc planning, group therapy    Progress note: Pt was seen in the hallway as she was walking around. Pt presented to be alert, oriented, open to this writer's approach, with a distressed look on face. Pt complained of abdominal pain and having diarrhea all night, attending u staff aware. Pt denied current si/hi/ah/vh and cont to be fixated on discharge adding 'i just want to be home, where I can sleep and get rest and then go see someone to help me with my issues\" pt informed that treatment team meeting and attending md rounds are still pending, which will give her a better idea of her treatment plan moving forward. Pt seems to be minimizing. Pt voiced understanding of the above suggestion. Pt cont to meet criteria for inpt stay for further stabilization through meds management.     LOS:  4  Expected LOS: 5-7  Insurance info/prescription coverage:  [place] [employer]   Date of last family contact:  pt has signed a eligio in mr lovell's name/fiance  on 2/12  Family requesting physician contact today:  No  Discharge plan:  to stabilize pt  Guns in the home:  No   Outpatient provider(s):  will be established prior to dc    Participating treatment team members: Zeynep Sparks, * (assigned SW), Joellen Bradshaw MS  
Client is pleasant and cooperative.Alert and oriented x 3.She is pleasant upon approach.She has a good appetite and reports sleeping well.Denies feeling suicidal or homicidal.Takes meds as prescribed and denies any side effects.She is hyperverbal.Social with peers.Verbalizes that she will volunter to stay at the hearing.Remains on close observation for safety.    
DISCHARGE SUMMARY    NAME:Zeynep Sparks  : 1995  MRN: [id number]    The patient Zeynep Sparks exhibits the ability to control behavior in a less restrictive environment.  Patient's level of functioning is improving.  No assaultive/destructive behavior has been observed for the past 24 hours.  No suicidal/homicidal threat or behavior has been observed for the past 24 hours.  There is no evidence of serious medication side effects.  Patient has not been in physical or protective restraints for at least the past 24 hours.    If weapons involved, how are they secured? N/a    Is patient aware of and in agreement with discharge plan? yes    Arrangements for medication:  Prescriptions see chart    Copy of discharge instructions to provider?:  yes    Arrangements for transportation home:  friend    Keep all follow up appointments as scheduled, continue to take prescribed medications per physician instructions.  Mental health crisis number:  [phone]  Mental Health Emergency WARM LINE      [phone] ([phone])      M-F: 9am to 9pm      Sat & Sun: 5pm - 9pm  National suicide prevention lines:                             1-134-UJGDSTT (8-506-106-3137)       8-271-945-TALK ([phone])    Crisis Text Line:  Text HOME to 618984  
Dayshift/Discharge    Pt up ad migel on unit pleasant and cooperative with staff and peers. Pt accepted scheduled medications without difficulty. Pt blood sugar 124 mg/dL at 0822 prior to eating breakfast. Pt told this writer that she recognizes that she needs to be compliant with medication and stated \"I thought everybody's mind was just racing like mine. I thought it was normal.\" Pt denied SI/HI/AVH to this writer and denied depression and anxiety. Pt denied any pain or physical complaints. Pt ordered to be discharged today by Dr. Wright. Writer reviewed AVS discharge summary with pt and pt verbalized understanding of follow up instructions/appointments. Pt prescriptions sent electronically to [facility] in [place]. Pt had no belongings in unit safe, storage, or security and this was verified by pt. Writer reviewed safety plan with pt and pt verbalized and confirmed understanding of plan. Pt continued to deny SI/HI at time of discharge and continued to deny pain or any physical complaints at time of discharge. Pt escorted off unit at 1335 to pt's husbands brother in personal vehicle.  
Nurse Note:    Patient observed sitting with peer on the floor before returning to her assigned room.  Patient is pleasant and cooperative during the assessment. Denies SI, HI, A/V hallucinations; states, \"I have signs from Steven and it comes in the form of men\". Denies anxiety and depression currently. No c/o pain. Patient declines Prn medications and is currently resting quietly with eyes closed. Will continue to monitor for safety.  
Nursing Admission Note     Zeynep arrived on unit via wheelchair at 0952, escorted by [facility] security and [place] [employer]. Patient wearing blue paper scrubs. States she urinated on her clothes in the emergency room. No inventory and/or personal belongings or electronic devices brought with patient. Patient was compliant with safety search by [employer]ere[employer] & [employer] CUEVAS.     Diagnosis:  Unspecified Mood (affective) Disorder  Admitting MD:  Kyle  Status: TDO, to be heard 24  Psychiatric History:  Bipolar Depression, Opioid Abuse  Medical HX:  Diabetes, Metformin PO daily, dosage unknown  Dr. Mohan consulted for H&P  UDS +THC  COVID negative  HCG negative    Presentation:  Patient was compliant with signing admission paperwork. On assessment, patient was hyperverbal, religiously preoccupied, endorsed 3-4 hours of sleep/night. Disorganized. Talks about having PTSD, from finding her father dead. States her father  2010, \"I was born on the date of his death\". Disorganized history of people and places, talking about the  Julian, who was the same person as the  who brought her from [place]. Repeated thought process \"They are rich, I am poor\". Daughter Teresa, being cared for by \"Lesley\", who worked at the residential where Teresa's father was incarcerated for meth, but now he's out. \"Lesley manipulated my daughter, manipulated her into a dog\".    Patient states a court date on  in [facility] for some sort of custody case, CPS involved, \"Lesley set this all up, to frame me and make me lose custody\".      History: inpatient hospitalization in [place], [place], at   [facility], and also in [place] at [facility].   Patient states her boyfriend is Harvey Mariee. And they are \" by God\". Harvey has a son from a previous relationship named Brian. Together they have a son named Rober, age 3years old.   Patient found her father dead age 
Nursing Note    Patient is alert and oriented x 2.  She denies any SI/HI/AH/VH.  Patient denies any anxiety or depression.  Broad affect and labile mood.  Patient remains delusional but states that her medication is helping clearing her thoughts and stops her mind from racing.  She stated that one of her peers who is on 1:1 called her a demon while passing in the hallway and she claims she is a child of God.  Patient seen watching TV in the dayroom with peers.  She voiced no complaints and accepted her medications without difficulty.  Staff will continue to monitor patient for safety.  
Nursing Note    Patient is alert and oriented x 3.  She denies any SI/HI/AH/VH.  Patient denies any anxiety and depression.  Broad affect and labile mood.  Patient seen watching TV in the dayroom.  She voiced no complaints and accepted her medications without difficulty.  Staff will continue to monitor patient for safety.  
Nursing Notes    Patient is alert and oriented x 3.  She denies any SI/HI/AH/VH.  Patient denies any anxiety or depression.  Restricted affect and isolated mood.  Patient remained in bed for the majority of the shift.  She voiced no complaints and accepted her medications without difficulty.  Staff will continue to monitor patient for safety.  
PSYCHOSOCIAL ASSESSMENT  :Patient identifying info:   Zeynep Sparks is a 28 y.o., female admitted 2024 10:44 AM     Presenting problem and precipitating factors:  Patient was compliant with signing admission paperwork. On assessment, patient was hyperverbal, religiously preoccupied, endorsed 3-4 hours of sleep/night. Disorganized. Talks about having PTSD, from finding her father dead. States her father  2010, \"I was born on the date of his death\". Disorganized history of people and places, talking about the  Julian, who was the same person as the  who brought her from [place]. Repeated thought process \"They are rich, I am poor\". Daughter Teresa, being cared for by \"Lesley\", who worked at the [employer] where Teresa's father was incarcerated for meth, but now he's out. \"Lesley manipulated my daughter, manipulated her into a dog\".       Mental status assessment: alert, oriented, calm, cooperative with a bright affect, pleasant upon approach. No aggression/agitation noted.     Strengths/Recreation/Coping Skills:read, music    Collateral information: pt has signed a eligio in mr lovell's name/fiance . Reached out, \"we have been together for past 3yrs, midway through dec she went to a Voodoo in [place] and from then on she said she had a connection, interaction with God and she just became manic, changing her Taoist, her sleep schedule has been way out of wack, she stays up at night, she has been eating like once a day\"    Current psychiatric /substance abuse providers and contact info: pt stated she sees a dr torres with [facility]    Previous psychiatric/substance abuse providers and response to treatment:  inpatient hospitalization in [place], [place], at   [facility], and also in [place] at [facility].     Family history of mental illness or substance abuse: Patient states her mother has mental illness and was hospitalized long term when 
Patient hyperverbal at times, and still shows some delusional thinking, and confabulation noted when telling stories. She was med compliant and requested trazodone and atarax for sleep and to relax. She did however, denies depression, anxiety, SI, HI and hallucinations though she seems anxious with pressured speech with interacting with staff. Patient seen in the halls and day room as well as attending music group and snack time. Pt has been sleeping well so far tonight after receiving medications, with no signs of distress noted, respirations regular and unlabored. Will continue to monitor patient every 15 mins as per unit protocol.   
Patient was very talkative and social at the beginning of the shift.Very pleasant and cooperative. Lying bed now with her eyes closed. Denies any anxiety, depression, SI, HI, auditory hallucinations, and visual hallucinations. Remains on every 15 minutes close observation.     
Pt assessed in pt room this AM following breakfast. PT stated she was \"fighting sleep trying to see what this place is all about\". Pt denied depression, anxiety, SI/HI/AVH. Pt labile and intrusive with peers at times. Pt presents with delusional thoughts persecutory in nature against her family and has Worship preoccupation at times. Pt ate 100% of all meals. Pt became threatening this afternoon due to wanting a soda and staff explained that sodas are only given at snack times at 1000 and 2030. Pt told this writer that she wanted to hire a . Home medications restarted from [facility] per Dr. Velasco. Pt denies pain or any physical complaints. Q 15 min checks continued to ensure pt safety.  
Pt refused dinner bg level check prior to metformin administration  
Pt up ad migel on unit this AM presenting with a bright affect. Pt came to nurses station and told this writer \"I feel like they're trying to treat something that's not really there. I don't feel like I'm mentally ill. Only reason they put me on meds is because my mom was on meds for an .\" Pt stated she wants to go home and pt was explained by writer the TDO process. Pt agreeing to stay until hearing but states she wants to speak to the doctor due to feeling like \"taking the medication might send me backwards.\" Writer encouraged pt to speak to the psychiatrist regarding these feelings. Pt agreed to take all scheduled medications and took them without difficulty. Pt stated she wanted to speak to her aunt Lilia. Writer opened pt profile and there is no Lilia on pt's profile for contact information. Pt told this writer \"My aunt works here and she sits where you're sitting right now.\" Pt denies depression but rates anxiety 3/10. Pt denies SI/HI/AVH but pt reports being \"touched by God last summer\". Q 15 min checks continued to ensure pt safety.   
labs    Gather additional collateral information from family and o/p treatment team to further elucidate the nature of patient's psychopathology and baselline level of psychiatric functioning.    Placed on close observation, for safety    Patient to engage in individual therapy, group therapy support group, psychoeducational group, safety planning.      Patient continue to address stressors that led to hospitalization.    Strengths-patient able to express self, average intelligence, has friend support.    Weakness-poor coping, comorbid substance abuse, limited primary support, psychosocial stressors    Discharge Criteria-  Patient is able to show progress and improvement in neurovegetative symptoms of depression, psychosis, patsy.  Patient is no longer actively suicidal or homicidal and has no command hallucinations.   Patient  is able to present with healthy ways to cope with current stressors.       Current Facility-Administered Medications:   •  acetaminophen (TYLENOL) tablet 650 mg, 650 mg, Oral, Q4H PRN, Denise Wright MD  •  hydrOXYzine HCl (ATARAX) tablet 50 mg, 50 mg, Oral, TID PRN, Denise Wright MD  •  traZODone (DESYREL) tablet 50 mg, 50 mg, Oral, Nightly PRN, Denise Wright MD  •  magnesium hydroxide (MILK OF MAGNESIA) 400 MG/5ML suspension 30 mL, 30 mL, Oral, Daily PRN, Denise Wright MD  •  LORazepam (ATIVAN) injection 1 mg, 1 mg, IntraMUSCular, Q6H PRN, Denise Wright MD  •  ziprasidone (GEODON) capsule 20 mg, 20 mg, Oral, BID PRN, Denise Wright MD  •  ziprasidone (GEODON) injection 20 mg, 20 mg, IntraMUSCular, Q12H PRN, Denise Wright MD  •  nicotine (NICODERM CQ) 21 MG/24HR 1 patch, 1 patch, TransDERmal, Daily, Denise Wright MD, 1 patch at 02/09/24 1609  •  cariprazine hcl (VRAYLAR) capsule 3 mg, 3 mg, Oral, Daily, Denise Wright MD, 3 mg at 02/09/24 1638  •  QUEtiapine (SEROQUEL XR) extended release tablet 150 mg, 150 mg, Oral, QPM, Denise Wright MD, 150 mg at 02/09/24 1705       ESTIMATED LENGTH OF STAY:    5 to 7 days

## 2024-02-15 NOTE — GROUP NOTE
Group Therapy Note    Date: 2/15/2024    Group Start Time: 0940  Group End Time: 1025  Group Topic: Education Group - Inpatient    [facility] 2  NON ACUTE    Cesilia Peopels        Group Therapy Note    Facilitated group to focus on defining different types of defense mechanisms and being able to recognize some defenses that was used to cope with stress and anxiety       Attendees: 6/6       Patient's Goal:  Client will learn and demonstrate effective coping skills     Notes:  Receptive to information discussed and engaged. Was able to share personal example of a defense used prior to admission or in the past such as “acting out, denial, displacement, devaluation and help-reject-complaining”      Status After Intervention:  Improved    Participation Level: Active Listener and Interactive    Participation Quality: Appropriate, Attentive, and Sharing      Speech:  normal      Thought Process/Content: Logical      Affective Functioning: Congruent      Mood:  Calm      Level of consciousness:  Attentive      Response to Learning: Able to verbalize current knowledge/experience and Progressing to goal      Endings: None Reported    Modes of Intervention: Education and Support      Discipline Responsible: Recreational Therapist      Signature:  SHELDON Thurman

## 2024-02-20 ENCOUNTER — HOSPITAL ENCOUNTER (INPATIENT)
Facility: HOSPITAL | Age: 29
LOS: 3 days | Discharge: HOME | End: 2024-02-23
Attending: EMERGENCY MEDICINE | Admitting: PSYCHIATRY & NEUROLOGY
Payer: MEDICAID

## 2024-02-20 DIAGNOSIS — Z00.8 MEDICAL CLEARANCE FOR PSYCHIATRIC ADMISSION: ICD-10-CM

## 2024-02-20 DIAGNOSIS — R44.3 HALLUCINATIONS: Primary | ICD-10-CM

## 2024-02-20 PROBLEM — F31.9 BIPOLAR 1 DISORDER (HCC): Status: ACTIVE | Noted: 2024-02-20

## 2024-02-20 PROBLEM — F31.9 BIPOLAR DISORDER WITH PSYCHOTIC FEATURES (HCC): Status: ACTIVE | Noted: 2024-02-20

## 2024-02-20 LAB
ALBUMIN SERPL-MCNC: 3.8 G/DL (ref 3.5–5)
ALBUMIN/GLOB SERPL: 1 (ref 1.1–2.2)
ALP SERPL-CCNC: 56 U/L (ref 45–117)
ALT SERPL-CCNC: 19 U/L (ref 12–78)
AMPHET UR QL SCN: NEGATIVE
ANION GAP SERPL CALC-SCNC: 4 MMOL/L (ref 5–15)
APAP SERPL-MCNC: <2 UG/ML (ref 10–30)
APPEARANCE UR: ABNORMAL
AST SERPL W P-5'-P-CCNC: 11 U/L (ref 15–37)
BACTERIA URNS QL MICRO: NEGATIVE /HPF
BARBITURATES UR QL SCN: NEGATIVE
BASOPHILS # BLD: 0 K/UL (ref 0–0.1)
BASOPHILS NFR BLD: 0 % (ref 0–1)
BENZODIAZ UR QL: NEGATIVE
BILIRUB SERPL-MCNC: 0.2 MG/DL (ref 0.2–1)
BILIRUB UR QL: NEGATIVE
BUN SERPL-MCNC: 10 MG/DL (ref 6–20)
BUN/CREAT SERPL: 15 (ref 12–20)
CA-I BLD-MCNC: 8.3 MG/DL (ref 8.5–10.1)
CANNABINOIDS UR QL SCN: POSITIVE
CHLORIDE SERPL-SCNC: 105 MMOL/L (ref 97–108)
CO2 SERPL-SCNC: 30 MMOL/L (ref 21–32)
COCAINE UR QL SCN: NEGATIVE
COLOR UR: ABNORMAL
CREAT SERPL-MCNC: 0.66 MG/DL (ref 0.55–1.02)
DIFFERENTIAL METHOD BLD: ABNORMAL
EOSINOPHIL # BLD: 0.1 K/UL (ref 0–0.4)
EOSINOPHIL NFR BLD: 1 % (ref 0–7)
EPITH CASTS URNS QL MICRO: ABNORMAL /LPF
ERYTHROCYTE [DISTWIDTH] IN BLOOD BY AUTOMATED COUNT: 14.7 % (ref 11.5–14.5)
ETHANOL SERPL-MCNC: <10 MG/DL (ref 0–0.08)
FLUAV RNA SPEC QL NAA+PROBE: NOT DETECTED
FLUBV RNA SPEC QL NAA+PROBE: NOT DETECTED
GLOBULIN SER CALC-MCNC: 3.7 G/DL (ref 2–4)
GLUCOSE SERPL-MCNC: 103 MG/DL (ref 65–100)
GLUCOSE UR STRIP.AUTO-MCNC: NEGATIVE MG/DL
HCG UR QL: NEGATIVE
HCT VFR BLD AUTO: 39.2 % (ref 35–47)
HGB BLD-MCNC: 12.6 G/DL (ref 11.5–16)
HGB UR QL STRIP: NEGATIVE
IMM GRANULOCYTES # BLD AUTO: 0 K/UL (ref 0–0.04)
IMM GRANULOCYTES NFR BLD AUTO: 0 % (ref 0–0.5)
KETONES UR QL STRIP.AUTO: NEGATIVE MG/DL
LEUKOCYTE ESTERASE UR QL STRIP.AUTO: NEGATIVE
LYMPHOCYTES # BLD: 2.4 K/UL (ref 0.8–3.5)
LYMPHOCYTES NFR BLD: 30 % (ref 12–49)
Lab: ABNORMAL
MAGNESIUM SERPL-MCNC: 1.9 MG/DL (ref 1.6–2.4)
MCH RBC QN AUTO: 25.9 PG (ref 26–34)
MCHC RBC AUTO-ENTMCNC: 32.1 G/DL (ref 30–36.5)
MCV RBC AUTO: 80.7 FL (ref 80–99)
METHADONE UR QL: NEGATIVE
MONOCYTES # BLD: 0.4 K/UL (ref 0–1)
MONOCYTES NFR BLD: 5 % (ref 5–13)
MUCOUS THREADS URNS QL MICRO: ABNORMAL /LPF
NEUTS SEG # BLD: 5 K/UL (ref 1.8–8)
NEUTS SEG NFR BLD: 64 % (ref 32–75)
NITRITE UR QL STRIP.AUTO: NEGATIVE
NRBC # BLD: 0 K/UL (ref 0–0.01)
NRBC BLD-RTO: 0 PER 100 WBC
OPIATES UR QL: NEGATIVE
PCP UR QL: NEGATIVE
PH UR STRIP: 6
PLATELET # BLD AUTO: 265 K/UL (ref 150–400)
PMV BLD AUTO: 11.5 FL (ref 8.9–12.9)
POTASSIUM SERPL-SCNC: 3.2 MMOL/L (ref 3.5–5.1)
PROT SERPL-MCNC: 7.5 G/DL (ref 6.4–8.2)
PROT UR STRIP-MCNC: NEGATIVE MG/DL
RBC # BLD AUTO: 4.86 M/UL (ref 3.8–5.2)
RBC #/AREA URNS HPF: ABNORMAL /HPF (ref 0–5)
SALICYLATES SERPL-MCNC: <1.7 MG/DL (ref 2.8–20)
SARS-COV-2 RNA RESP QL NAA+PROBE: NOT DETECTED
SODIUM SERPL-SCNC: 139 MMOL/L (ref 136–145)
SP GR UR REFRACTOMETRY: 1.01 (ref 1–1.03)
URINE CULTURE IF INDICATED: ABNORMAL
UROBILINOGEN UR QL STRIP.AUTO: 0.1 EU/DL (ref 0.2–1)
WBC # BLD AUTO: 7.9 K/UL (ref 3.6–11)
WBC URNS QL MICRO: ABNORMAL /HPF (ref 0–4)

## 2024-02-20 PROCEDURE — 85025 COMPLETE CBC W/AUTO DIFF WBC: CPT

## 2024-02-20 PROCEDURE — 87636 SARSCOV2 & INF A&B AMP PRB: CPT

## 2024-02-20 PROCEDURE — 1240000000 HC EMOTIONAL WELLNESS R&B

## 2024-02-20 PROCEDURE — 83735 ASSAY OF MAGNESIUM: CPT

## 2024-02-20 PROCEDURE — 99285 EMERGENCY DEPT VISIT HI MDM: CPT

## 2024-02-20 PROCEDURE — 81001 URINALYSIS AUTO W/SCOPE: CPT

## 2024-02-20 PROCEDURE — 83036 HEMOGLOBIN GLYCOSYLATED A1C: CPT

## 2024-02-20 PROCEDURE — 36415 COLL VENOUS BLD VENIPUNCTURE: CPT

## 2024-02-20 PROCEDURE — 6370000000 HC RX 637 (ALT 250 FOR IP): Performed by: EMERGENCY MEDICINE

## 2024-02-20 PROCEDURE — 82077 ASSAY SPEC XCP UR&BREATH IA: CPT

## 2024-02-20 PROCEDURE — 6370000000 HC RX 637 (ALT 250 FOR IP): Performed by: PSYCHIATRY & NEUROLOGY

## 2024-02-20 PROCEDURE — 80179 DRUG ASSAY SALICYLATE: CPT

## 2024-02-20 PROCEDURE — 80307 DRUG TEST PRSMV CHEM ANLYZR: CPT

## 2024-02-20 PROCEDURE — 81025 URINE PREGNANCY TEST: CPT

## 2024-02-20 PROCEDURE — 80143 DRUG ASSAY ACETAMINOPHEN: CPT

## 2024-02-20 PROCEDURE — 80053 COMPREHEN METABOLIC PANEL: CPT

## 2024-02-20 RX ORDER — ACETAMINOPHEN 325 MG/1
650 TABLET ORAL EVERY 4 HOURS PRN
Status: DISCONTINUED | OUTPATIENT
Start: 2024-02-20 | End: 2024-02-23 | Stop reason: HOSPADM

## 2024-02-20 RX ORDER — OXCARBAZEPINE 300 MG/1
300 TABLET, FILM COATED ORAL 2 TIMES DAILY
Status: DISCONTINUED | OUTPATIENT
Start: 2024-02-20 | End: 2024-02-23 | Stop reason: HOSPADM

## 2024-02-20 RX ORDER — MAGNESIUM HYDROXIDE/ALUMINUM HYDROXICE/SIMETHICONE 120; 1200; 1200 MG/30ML; MG/30ML; MG/30ML
30 SUSPENSION ORAL EVERY 6 HOURS PRN
Status: DISCONTINUED | OUTPATIENT
Start: 2024-02-20 | End: 2024-02-23 | Stop reason: HOSPADM

## 2024-02-20 RX ORDER — HYDROXYZINE 50 MG/1
50 TABLET, FILM COATED ORAL 3 TIMES DAILY PRN
Status: DISCONTINUED | OUTPATIENT
Start: 2024-02-20 | End: 2024-02-23 | Stop reason: HOSPADM

## 2024-02-20 RX ORDER — ACETAMINOPHEN 500 MG
1000 TABLET ORAL
Status: COMPLETED | OUTPATIENT
Start: 2024-02-20 | End: 2024-02-20

## 2024-02-20 RX ORDER — ZIPRASIDONE HYDROCHLORIDE 20 MG/1
20 CAPSULE ORAL EVERY 12 HOURS PRN
Status: DISCONTINUED | OUTPATIENT
Start: 2024-02-20 | End: 2024-02-23 | Stop reason: HOSPADM

## 2024-02-20 RX ORDER — QUETIAPINE 150 MG/1
300 TABLET, FILM COATED, EXTENDED RELEASE ORAL NIGHTLY
Status: DISCONTINUED | OUTPATIENT
Start: 2024-02-20 | End: 2024-02-23 | Stop reason: HOSPADM

## 2024-02-20 RX ORDER — TRAZODONE HYDROCHLORIDE 50 MG/1
50 TABLET ORAL NIGHTLY PRN
Status: DISCONTINUED | OUTPATIENT
Start: 2024-02-20 | End: 2024-02-23 | Stop reason: HOSPADM

## 2024-02-20 RX ORDER — NICOTINE 21 MG/24HR
1 PATCH, TRANSDERMAL 24 HOURS TRANSDERMAL DAILY
Status: DISCONTINUED | OUTPATIENT
Start: 2024-02-20 | End: 2024-02-22

## 2024-02-20 RX ORDER — ATORVASTATIN CALCIUM 10 MG/1
10 TABLET, FILM COATED ORAL NIGHTLY
Status: DISCONTINUED | OUTPATIENT
Start: 2024-02-20 | End: 2024-02-23 | Stop reason: HOSPADM

## 2024-02-20 RX ORDER — ZIPRASIDONE MESYLATE 20 MG/ML
20 INJECTION, POWDER, LYOPHILIZED, FOR SOLUTION INTRAMUSCULAR EVERY 12 HOURS PRN
Status: DISCONTINUED | OUTPATIENT
Start: 2024-02-20 | End: 2024-02-23 | Stop reason: HOSPADM

## 2024-02-20 RX ADMIN — METFORMIN HYDROCHLORIDE 850 MG: 850 TABLET ORAL at 16:45

## 2024-02-20 RX ADMIN — OXCARBAZEPINE 300 MG: 300 TABLET, FILM COATED ORAL at 22:22

## 2024-02-20 RX ADMIN — ATORVASTATIN CALCIUM 10 MG: 10 TABLET, FILM COATED ORAL at 21:16

## 2024-02-20 RX ADMIN — ACETAMINOPHEN 1000 MG: 500 TABLET ORAL at 06:25

## 2024-02-20 RX ADMIN — QUETIAPINE FUMARATE 300 MG: 150 TABLET, EXTENDED RELEASE ORAL at 23:09

## 2024-02-20 RX ADMIN — ACETAMINOPHEN 650 MG: 325 TABLET ORAL at 18:28

## 2024-02-20 RX ADMIN — POTASSIUM BICARBONATE 40 MEQ: 782 TABLET, EFFERVESCENT ORAL at 09:05

## 2024-02-20 RX ADMIN — ZIPRASIDONE HYDROCHLORIDE 20 MG: 20 CAPSULE ORAL at 21:16

## 2024-02-20 ASSESSMENT — PAIN SCALES - GENERAL
PAINLEVEL_OUTOF10: 8
PAINLEVEL_OUTOF10: 0
PAINLEVEL_OUTOF10: 6

## 2024-02-20 ASSESSMENT — PAIN DESCRIPTION - DESCRIPTORS: DESCRIPTORS: ACHING

## 2024-02-20 ASSESSMENT — SLEEP AND FATIGUE QUESTIONNAIRES
DO YOU USE A SLEEP AID: NO
DO YOU HAVE DIFFICULTY SLEEPING: YES
AVERAGE NUMBER OF SLEEP HOURS: 3

## 2024-02-20 ASSESSMENT — LIFESTYLE VARIABLES
HOW OFTEN DO YOU HAVE A DRINK CONTAINING ALCOHOL: NEVER
HOW MANY STANDARD DRINKS CONTAINING ALCOHOL DO YOU HAVE ON A TYPICAL DAY: PATIENT DOES NOT DRINK

## 2024-02-20 ASSESSMENT — PAIN - FUNCTIONAL ASSESSMENT: PAIN_FUNCTIONAL_ASSESSMENT: 0-10

## 2024-02-20 NOTE — ED PROVIDER NOTES
[facility]  EMERGENCY DEPARTMENT HISTORY AND PHYSICAL EXAM      Date: 2/20/2024  Patient Name: Zeynep Sparks  MRN: [id number]  Birthdate 1995  Date of evaluation: 2/20/2024  Provider: Ga Bradley DO   Note Started: 5:36 AM EST 2/20/24    HISTORY OF PRESENT ILLNESS     Chief Complaint   Patient presents with    Mental Health Problem     History Provided By: Patient    HPI: Zeynep Sparks is a 28 y.o. female with past medical history of    who presents with hallucinations    PAST MEDICAL HISTORY   Past Medical History:  No past medical history on file.    Past Surgical History:  No past surgical history on file.    Family History:  No family history on file.    Social History:  Social History     Tobacco Use    Smoking status: Every Day     Types: Cigarettes       Allergies:  No Known Allergies    PCP: None, None    Current Meds:   Current Facility-Administered Medications   Medication Dose Route Frequency Provider Last Rate Last Admin    acetaminophen (TYLENOL) tablet 650 mg  650 mg Oral Q4H PRN Kurt Velasco MD   650 mg at 02/20/24 1828    hydrOXYzine HCl (ATARAX) tablet 50 mg  50 mg Oral TID PRN Kurt Velasco MD        traZODone (DESYREL) tablet 50 mg  50 mg Oral Nightly PRN Kurt Velasco MD        magnesium hydroxide (MILK OF MAGNESIA) 400 MG/5ML suspension 30 mL  30 mL Oral Daily PRN Kurt Velasco MD        aluminum & magnesium hydroxide-simethicone (MAALOX) 200-200-20 MG/5ML suspension 30 mL  30 mL Oral Q6H PRN Kurt Velasco MD        atorvastatin (LIPITOR) tablet 10 mg  10 mg Oral Nightly Kurt Velasco MD   10 mg at 02/20/24 2116    metFORMIN (GLUCOPHAGE) tablet 850 mg  850 mg Oral BID WC Kurt Velasco MD   850 mg at 02/21/24 0939    QUEtiapine (SEROQUEL XR) 150 MG extended release tablet 300 mg  300 mg Oral Nightly Kurt Velasco MD   300 mg at 02/20/24 2309    sertraline (ZOLOFT) tablet 100 mg  100 mg Oral Daily Kurt Velasco MD        ziprasidone  DESYREL  Take 1 tablet by mouth nightly as needed for Sleep              DISCONTINUED MEDICATIONS:  Current Discharge Medication List          ED FINAL IMPRESSION     1. Hallucinations    2. Medical clearance for psychiatric admission         I am the primary provider of record. Ga Bradley DO (electronically signed)    (Please note that parts of this dictation were completed with voice recognition software. Quite often unanticipated grammatical, syntax, homophones, and other interpretive errors are inadvertently transcribed by the computer software. Please disregards these errors. Please excuse any errors that have escaped final proofreading.)       Ga Bradley,   02/21/24 1240

## 2024-02-20 NOTE — BH NOTE
treatment or motivation for change: I had to come or my fiance wouldn't leave me.    Family constellation: Pt is engaged and has a 6 year old child in the care of the paternal grandmother.     Is significant other involved? Yes    Describe support system: Corinne Mr. Mariee    Describe living arrangements and home environment: Pt lives with her corinne    GUARDIAN/POA: No    Guardian Name: n/a    Guardian Contact: n/a    Health issues: High cholesterol, diabetes    Trauma history: pt stated she found her dad dead at age 14     Legal issues: Patient states a court date on  in [facility] for some sort of custody case, CPS involved, \"Lesley set this all up, to frame me and make me lose custody\". Per corinne, pt's has court coming up in regards to custody of her daughter, \"the child's grandmother on the dad's side wants custody\"        History of  service: No    Financial status: unemployed    Holiness/cultural factors: none voiced    Education/work history: GED/unemployed    Have you been licensed as a health care professional (current or ): No    Describe coping skills:Maladaptive    SANA MENDEZ  2024

## 2024-02-20 NOTE — BH NOTE
Contacted Brian Mariee [phone]. He states pt is not willing to stop smoking marijuana and expects her to stay in the hospital long enough to get right. Writer explained inpatient substance abuse treatment as an option. Pt is willing to consider but likes how it helps her get in touch with her spirituality.

## 2024-02-20 NOTE — ED NOTES
TRANSFER - OUT REPORT:    Verbal report given to Jacki CASH on Zeynep Sparks  being transferred to [facility] for routine progression of patient care       Report consisted of patient's Situation, Background, Assessment and   Recommendations(SBAR).     Information from the following report(s) ED SBAR was reviewed with the receiving nurse.    [place] Fall Assessment:                           Lines:       Opportunity for questions and clarification was provided.      Patient transported with:  Ronald Echeverria RN
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
## 2024-02-20 NOTE — BSMART NOTE
BSMART assessment completed, and suicide risk level noted to be Moderate risk. Primary Nurse Jose CASH and Charge Nurse N/A and Physician Kirk DO notified. Concerns not observed.       
is normal.      Sydney Kimball, MSW
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
## 2024-02-20 NOTE — GROUP NOTE
Group Therapy Note    Date: 2/20/2024    Group Start Time: 1300  Group End Time: 1330  Group Topic: Process Group - Inpatient    SSR 2 BEHA [facility]    Joellen Bradshaw        Group Therapy Note: This writer provided a handout on \"building happiness\" to each individual to work on independently and reach out to any of the 's if further assistance was needed in order to process the information.     Attendees: 9         Patient's Goal:  to attend groups    Notes:      Group Therapy Note: This writer provided a handout on \"building happiness\" to each individual to work on independently and reach out to any of the 's if further assistance was needed in order to process the information.         Signature:  Joellen Bradshaw

## 2024-02-21 LAB
ANION GAP SERPL CALC-SCNC: 1 MMOL/L (ref 5–15)
BUN SERPL-MCNC: 10 MG/DL (ref 6–20)
BUN/CREAT SERPL: 15 (ref 12–20)
CA-I BLD-MCNC: 8.3 MG/DL (ref 8.5–10.1)
CHLORIDE SERPL-SCNC: 108 MMOL/L (ref 97–108)
CO2 SERPL-SCNC: 29 MMOL/L (ref 21–32)
CREAT SERPL-MCNC: 0.68 MG/DL (ref 0.55–1.02)
EST. AVERAGE GLUCOSE BLD GHB EST-MCNC: 117 MG/DL
GLUCOSE SERPL-MCNC: 92 MG/DL (ref 65–100)
HBA1C MFR BLD: 5.7 % (ref 4–5.6)
POTASSIUM SERPL-SCNC: 3.3 MMOL/L (ref 3.5–5.1)
SODIUM SERPL-SCNC: 138 MMOL/L (ref 136–145)

## 2024-02-21 PROCEDURE — 1240000000 HC EMOTIONAL WELLNESS R&B

## 2024-02-21 PROCEDURE — 80048 BASIC METABOLIC PNL TOTAL CA: CPT

## 2024-02-21 PROCEDURE — 6360000002 HC RX W HCPCS: Performed by: PSYCHIATRY & NEUROLOGY

## 2024-02-21 PROCEDURE — 6370000000 HC RX 637 (ALT 250 FOR IP): Performed by: PSYCHIATRY & NEUROLOGY

## 2024-02-21 PROCEDURE — 6370000000 HC RX 637 (ALT 250 FOR IP): Performed by: PHYSICIAN ASSISTANT

## 2024-02-21 PROCEDURE — 36415 COLL VENOUS BLD VENIPUNCTURE: CPT

## 2024-02-21 RX ORDER — POTASSIUM CHLORIDE 20 MEQ/1
40 TABLET, EXTENDED RELEASE ORAL ONCE
Status: COMPLETED | OUTPATIENT
Start: 2024-02-21 | End: 2024-02-21

## 2024-02-21 RX ORDER — CHLORPROMAZINE HYDROCHLORIDE 50 MG/1
50 TABLET, FILM COATED ORAL 3 TIMES DAILY
Status: DISCONTINUED | OUTPATIENT
Start: 2024-02-21 | End: 2024-02-23 | Stop reason: HOSPADM

## 2024-02-21 RX ADMIN — METFORMIN HYDROCHLORIDE 850 MG: 850 TABLET ORAL at 09:39

## 2024-02-21 RX ADMIN — METFORMIN HYDROCHLORIDE 850 MG: 850 TABLET ORAL at 15:40

## 2024-02-21 RX ADMIN — SERTRALINE HYDROCHLORIDE 100 MG: 50 TABLET ORAL at 14:54

## 2024-02-21 RX ADMIN — POTASSIUM CHLORIDE 40 MEQ: 1500 TABLET, EXTENDED RELEASE ORAL at 14:54

## 2024-02-21 RX ADMIN — QUETIAPINE FUMARATE 300 MG: 150 TABLET, EXTENDED RELEASE ORAL at 21:41

## 2024-02-21 RX ADMIN — OXCARBAZEPINE 300 MG: 300 TABLET, FILM COATED ORAL at 09:39

## 2024-02-21 RX ADMIN — ZIPRASIDONE MESYLATE 20 MG: 20 INJECTION, POWDER, LYOPHILIZED, FOR SOLUTION INTRAMUSCULAR at 18:42

## 2024-02-21 RX ADMIN — CHLORPROMAZINE HYDROCHLORIDE 50 MG: 50 TABLET, FILM COATED ORAL at 15:40

## 2024-02-21 RX ADMIN — OXCARBAZEPINE 300 MG: 300 TABLET, FILM COATED ORAL at 21:32

## 2024-02-21 RX ADMIN — TRAZODONE HYDROCHLORIDE 50 MG: 50 TABLET ORAL at 21:32

## 2024-02-21 RX ADMIN — CHLORPROMAZINE HYDROCHLORIDE 50 MG: 50 TABLET, FILM COATED ORAL at 21:32

## 2024-02-21 RX ADMIN — ATORVASTATIN CALCIUM 10 MG: 10 TABLET, FILM COATED ORAL at 21:32

## 2024-02-21 ASSESSMENT — ENCOUNTER SYMPTOMS
NAUSEA: 0
BACK PAIN: 0
DIARRHEA: 0
VOMITING: 0
SHORTNESS OF BREATH: 0
COUGH: 0
WHEEZING: 0
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
## 2024-02-21 NOTE — CONSULTS
Consult    Patient: Zeynep Sparks MRN: [id number]  SSN: [id number]    YOB: 1995  Age: 28 y.o.  Sex: female      Subjective:      Chief Complaint   Patient presents with    Mental Health Problem        Zeynep Sparks is a 28 y.o. female who is being seen for hallucinations. She reportedly has been hearing external voices. Of note she was recently discharged from the psychiatric unit. She was evaluated in the ED and admitted to the psychiatric unit.  Hospital medicine was consulted for medical management.    Subjective:  Patient seen in psychiatric unit.  She states that she is not having any issues aside from boredom.  We discussed overall health, weight control, diabetes control, cholesterol.  She denies any other complaints including nausea, vomiting, abdominal pain, cough, shortness of breath, chest pain, diarrhea, weakness, numbness, headache.    No past medical history on file.  No past surgical history on file.   No family history on file.  Social History     Tobacco Use    Smoking status: Every Day     Types: Cigarettes    Smokeless tobacco: Not on file   Substance Use Topics    Alcohol use: Not on file      Current Facility-Administered Medications   Medication Dose Route Frequency Provider Last Rate Last Admin    acetaminophen (TYLENOL) tablet 650 mg  650 mg Oral Q4H PRN Kurt Velasco MD   650 mg at 02/20/24 1828    hydrOXYzine HCl (ATARAX) tablet 50 mg  50 mg Oral TID PRN Kurt Velasco MD        traZODone (DESYREL) tablet 50 mg  50 mg Oral Nightly PRN Kurt Velasco MD        magnesium hydroxide (MILK OF MAGNESIA) 400 MG/5ML suspension 30 mL  30 mL Oral Daily PRN Kurt Velasco MD        aluminum & magnesium hydroxide-simethicone (MAALOX) 200-200-20 MG/5ML suspension 30 mL  30 mL Oral Q6H PRN Kurt Velasco MD        atorvastatin (LIPITOR) tablet 10 mg  10 mg Oral Nightly Kurt Velasco MD   10 mg at 02/20/24 2116    metFORMIN (GLUCOPHAGE) tablet 850  hypoglycemia    Discussed case with patient, nurse    MDM Discussion total time spent 35 minutes    Signed By: David Simons PA-C     February 21, 2024

## 2024-02-21 NOTE — BH NOTE
Patient agitated on unit, yelling and loud in hallways about select female peer, paranoid, delusional. Difficult to reason, yelling about family. Escorted to her room, allowed time to express and process emotions, accepted 20mg IM geodon RGM, receptive to staff education about emotion regulation and respect of peers. Q15 minute checks maintained.

## 2024-02-21 NOTE — BH NOTE
Behavioral Health Treatment Team Note     Patient goal(s) for today: none voiced  Treatment team focus/goals: medication management, group therapy, discharge planning    Progress note: Pt is observed in the day room eating dinner. She presents calm and cooperative with bright affect. She denies SI/HI/AVH. When asked how she's doing she said, \"I don't know. I'm here. I guess I just wanted to see you again\" in a half-hearted tone. Writer asked if pt's tang was able to find her car. She said no. Writer contacted Felipe Helton's department and confirmed the location of the car. Writer contacted pt's fichiquita and provide the address to the gas station: [place] [address] in [place]. An inpatient level of care is still needed to further stabilize pt on medication.     LOS:  1  Expected LOS: TDO hearing Friday 2/23.     Insurance info/prescription coverage:  [employer]  Date of last family contact:  2/21/24  Family requesting physician contact today:  No  Discharge plan:  Stabilize and connect to resources  Guns in the home:  No  Outpatient provider(s):  to be coordinated.    Participating treatment team members: Zeynep Sparks, SANA MENDEZ

## 2024-02-21 NOTE — PLAN OF CARE
Problem: Discharge Planning  Goal: Discharge to home or other facility with appropriate resources  2/21/2024 0039 by Trent Tong, RN  Outcome: Not Progressing  2/20/2024 1256 by Vangie Victor, RN  Outcome: Progressing     Problem: Risk for Elopement  Goal: Patient will not exit the unit/facility without proper excort  Outcome: Not Progressing

## 2024-02-21 NOTE — BH NOTE
Patient continues to come to this writer about her gums, rotten teeth and says they are infect. Nurse and other staff have explained several times that the nurse's can not diagnose or do anything about her teeth or the gums bleeding as she continues to brush them over and over again. No blood seen at this moment when standing at nurse's station door. She has pulled her lower lip down showing the rotten tooth. Nurse explained that the physicians will be in to seen patient in the morning to do her H & P because she is a new admit. Pt gets mad because she is not getting enough attention  from staff, as she comes every couple minutes to ask about her gums since the change of shift at 1900. Her own nurse, Nathan, has also explained as well as the off going day shift that the nurse can't give her antibx as there is not immediate sign of infection, she is afebrile and no blood was seen at this moment. Nurse educated about good oral hygiene, and to not brush too hard or too many times a day continuing to irritate the gums. She was informed to see the physician in the morning and a dentist when she discharges. Then she asked about dentures, nurse again said that is something to speak to the dentist about and patient got made at nurse and said \"I am reporting you\", and angrily was hyperverbal talking to other patients about this nurse not helping her with her teeth, saying \"She won't even look at them\". Nurse saw the patient pull the lower lip down, saw the brown/decay of bottom tooth and saw some redness on the gum, but this was not sufficient for patient. Nurse then stated that the conversation was over and slowly closed the door to the nurse's station as patient was verbally rude and accusatory stating \"you just slammed the door in my face\" which as per witnesses, her nurse Trent, and the tech who were present, writer did not slam door in patient's face.     Pt then continued to complain about staff standing and staring  into the nurse's station while nurse is writing this note. Then started running in the moreno and being loud while others are attempting to sleep. Staff asked the patient and her 2 peers to quiet down, and not run in the moreno. At this time she is still in the moreno talking loudly with 2 staff members, one older male  patient who she says is her father, which they do no not know each other at all or related. Patient engaged in a hug with this male about 30 mins prior and was informed by staff there is no touching allowed among patients and that they need to step a few feet away while talking as they were very close up on each other.     Will continue to monitor patient very closely every 15 mins as per unit protocol.

## 2024-02-21 NOTE — GROUP NOTE
Group Therapy Note    Date: 2/21/2024    Group Start Time: 1315  Group End Time: 1400  Group Topic: Process Group - Inpatient    SSR 2 BE[facility]    Radha Cordoba        Group Therapy Note  Process group was focused on self-identity and discovery. Writer provided the pts with self-discovery questions with topics of self-exploration, life purpose, career, passions, self-awareness and relationships. The ice breaker question was \"where do you see yourself in 5 years\". Pts were quite but shared there answers when prompted.   Attendees: 3-9       Patient's Goal:  \"to improve on my communication with others'    Notes:  Pt presented with a lethargic affect and congruent mood. Pts thoughts were delusional, illogical and disorganized at times. Pt would start to talk about an off topic subject interrupting others. She was able to be redirected. Pt stated in 5 years she sees herself living in [place] and owning businesses. She would like to open \"a place like this but get people their rights back\". When another pt was speaking, pt interrupted her stating that she is directing her comments to her. Writer reassured the pt that it was not about her and the pt walked out of the room.     Status After Intervention:  Unchanged    Participation Level: Active Listener, Interactive, and Monopolizing    Participation Quality: Attentive, Sharing, and Intrusive      Speech:  slow      Thought Process/Content: Delusional      Affective Functioning: Incongruent      Mood:  \"good\"      Level of consciousness:  Attentive and Drowsy      Response to Learning: Able to verbalize/acknowledge new learning and Progressing to goal      Endings: None Reported    Modes of Intervention: Exploration, Activity, and Confrontation      Discipline Responsible: /Counselor      Signature:  Radha Cordoba
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
## 2024-02-21 NOTE — PLAN OF CARE
Problem: Inessa  Goal: Will exhibit normal sleep and speech and no impulsivity  Description: INTERVENTIONS:  1. Administer medication as ordered  2. Set limits on impulsive behavior  3. Make attempts to decrease external stimuli as possible  Outcome: Progressing     Problem: Psychosis  Goal: Will report no hallucinations or delusions  Description: INTERVENTIONS:  1. Administer medication as  ordered  2. Assist with reality testing to support increasing orientation  3. Assess if patient's hallucinations or delusions are encouraging self harm or harm to others and intervene as appropriate  Outcome: Progressing     Problem: Behavior  Goal: Pt/Family maintain appropriate behavior and adhere to behavioral management agreement, if implemented  Description: INTERVENTIONS:  1. Assess patient/family's coping skills and  non-compliant behavior (including use of illegal substances)  2. Notify security of behavior or suspected illegal substances which indicate the need for search of the family and/or belongings  3. Encourage verbalization of thoughts and concerns in a socially appropriate manner  4. Utilize positive, consistent limit setting strategies supporting safety of patient, staff and others  5. Encourage participation in the decision making process about the behavioral management agreement  6. If a visitor's behavior poses a threat to safety call refer to organization policy.  7. Initiate consult with , Psychosocial CNS, Spiritual Care as appropriate  Outcome: Progressing     Problem: Discharge Planning  Goal: Discharge to home or other facility with appropriate resources  2/21/2024 0039 by Trent Tong, RN  Outcome: Not Progressing     Problem: Risk for Elopement  Goal: Patient will not exit the unit/facility without proper excort  2/21/2024 0039 by Trent Tong, RN  Outcome: Not Progressing

## 2024-02-21 NOTE — BH NOTE
Patient visible on unit. Alert & oriented. Attention-seeking, intrusive. Rude with select staff and attempts to be manipulative. Compliant with scheduled medications. Received first dose of thorazine, no side effects reported. She did sleep for several hours this morning, stated she was still tired from \"all the sleeping medicine\". She is fixated on family members, talking about nieces, mothers, uncles, brothers.  She believes a male peer on unit is her father. States she is ADHD & ADD. She consumes 50% of meals. She was TDO'ed on last night and will have her commitment hearing on Friday 2.23.24. Plan of care ongoing. Q15 minute checks maintained for safety.

## 2024-02-21 NOTE — GROUP NOTE
Group Therapy Note    Date: 2/20/2024    Group Start Time: 1945  Group End Time: 2030  Group Topic: Recreational    SSR 2 BH NON ACUTE    Cady Langston        Group Therapy Note    Attendees: 7/9    Recreational Therapist facilitated structured leisure skills group to introduce healthy leisure skills as positive way to cope and manage mood.         Patient's Goal:  Attend groups daily    Notes:  Attended group. Pt loud and argumentative at times during session. Required limit setting. Was unable to accept limits from staff. Pt in and out of group session.     Status After Intervention:  Decompensated    Participation Level: Monopolizing at times    Participation Quality: Attentive      Speech:  normal      Thought Process/Content: Perseverating      Affective Functioning: Exaggerated      Mood:  labile      Level of consciousness:  Preoccupied      Response to Learning: Progressing to goal      Endings: None Reported    Modes of Intervention: Activity      Discipline Responsible: Recreational Therapist      Signature:  SHELDON Rodriguez

## 2024-02-21 NOTE — BH NOTE
Behavioral Health Treatment Team Note     Patient goal(s) for today: None voiced  Treatment team focus/goals: Medication management, group therapy, discharge planning    Progress note: Pt was observed in the dayroom talking on the phone during rounds. Will follow up as needed. An inpatient level of care is needed to coordinate a safe discharge.     LOS:  1  Expected LOS: TDO hearing on Friday 2/23    Insurance info/prescription coverage:  [employer]  Date of last family contact:  2/20/24  Family requesting physician contact today:  No  Discharge plan:  Stabilize and connect to resources  Guns in the home:  No  Outpatient provider(s):  To be coordinated    Participating treatment team members: Zeynep Sparks, SANA MENDEZ

## 2024-02-22 LAB
ANION GAP SERPL CALC-SCNC: 1 MMOL/L (ref 5–15)
BUN SERPL-MCNC: 15 MG/DL (ref 6–20)
BUN/CREAT SERPL: 21 (ref 12–20)
CA-I BLD-MCNC: 9 MG/DL (ref 8.5–10.1)
CHLORIDE SERPL-SCNC: 109 MMOL/L (ref 97–108)
CO2 SERPL-SCNC: 29 MMOL/L (ref 21–32)
CREAT SERPL-MCNC: 0.73 MG/DL (ref 0.55–1.02)
GLUCOSE SERPL-MCNC: 109 MG/DL (ref 65–100)
POTASSIUM SERPL-SCNC: 3.6 MMOL/L (ref 3.5–5.1)
SODIUM SERPL-SCNC: 139 MMOL/L (ref 136–145)

## 2024-02-22 PROCEDURE — 6370000000 HC RX 637 (ALT 250 FOR IP): Performed by: PSYCHIATRY & NEUROLOGY

## 2024-02-22 PROCEDURE — 36415 COLL VENOUS BLD VENIPUNCTURE: CPT

## 2024-02-22 PROCEDURE — 1240000000 HC EMOTIONAL WELLNESS R&B

## 2024-02-22 PROCEDURE — 80048 BASIC METABOLIC PNL TOTAL CA: CPT

## 2024-02-22 RX ORDER — NICOTINE 21 MG/24HR
1 PATCH, TRANSDERMAL 24 HOURS TRANSDERMAL DAILY
Status: DISCONTINUED | OUTPATIENT
Start: 2024-02-23 | End: 2024-02-23 | Stop reason: HOSPADM

## 2024-02-22 RX ADMIN — OXCARBAZEPINE 300 MG: 300 TABLET, FILM COATED ORAL at 20:40

## 2024-02-22 RX ADMIN — TRAZODONE HYDROCHLORIDE 50 MG: 50 TABLET ORAL at 20:40

## 2024-02-22 RX ADMIN — METFORMIN HYDROCHLORIDE 850 MG: 850 TABLET ORAL at 08:04

## 2024-02-22 RX ADMIN — ATORVASTATIN CALCIUM 10 MG: 10 TABLET, FILM COATED ORAL at 20:40

## 2024-02-22 RX ADMIN — CHLORPROMAZINE HYDROCHLORIDE 50 MG: 50 TABLET, FILM COATED ORAL at 20:40

## 2024-02-22 RX ADMIN — CHLORPROMAZINE HYDROCHLORIDE 50 MG: 50 TABLET, FILM COATED ORAL at 08:04

## 2024-02-22 RX ADMIN — QUETIAPINE FUMARATE 300 MG: 150 TABLET, EXTENDED RELEASE ORAL at 20:40

## 2024-02-22 RX ADMIN — HYDROXYZINE HYDROCHLORIDE 50 MG: 50 TABLET, FILM COATED ORAL at 11:22

## 2024-02-22 RX ADMIN — ACETAMINOPHEN 650 MG: 325 TABLET ORAL at 11:22

## 2024-02-22 RX ADMIN — ACETAMINOPHEN 650 MG: 325 TABLET ORAL at 07:04

## 2024-02-22 RX ADMIN — METFORMIN HYDROCHLORIDE 850 MG: 850 TABLET ORAL at 15:27

## 2024-02-22 RX ADMIN — OXCARBAZEPINE 300 MG: 300 TABLET, FILM COATED ORAL at 08:04

## 2024-02-22 RX ADMIN — CHLORPROMAZINE HYDROCHLORIDE 50 MG: 50 TABLET, FILM COATED ORAL at 15:27

## 2024-02-22 RX ADMIN — SERTRALINE HYDROCHLORIDE 100 MG: 50 TABLET ORAL at 08:04

## 2024-02-22 RX ADMIN — ACETAMINOPHEN 650 MG: 325 TABLET ORAL at 20:40

## 2024-02-22 ASSESSMENT — PAIN DESCRIPTION - ORIENTATION: ORIENTATION: LEFT

## 2024-02-22 ASSESSMENT — PAIN - FUNCTIONAL ASSESSMENT: PAIN_FUNCTIONAL_ASSESSMENT: ACTIVITIES ARE NOT PREVENTED

## 2024-02-22 ASSESSMENT — PAIN SCALES - GENERAL: PAINLEVEL_OUTOF10: 8

## 2024-02-22 ASSESSMENT — PAIN DESCRIPTION - LOCATION: LOCATION: MOUTH

## 2024-02-22 ASSESSMENT — PAIN DESCRIPTION - DESCRIPTORS: DESCRIPTORS: ACHING

## 2024-02-22 NOTE — BH NOTE
Behavioral Health Treatment Team Note     Patient goal(s) for today: To find a therapist  Treatment team focus/goals: Medication management, group therapy, discharge planning    Progress note: Pt presents tearful and requested to speak with writer. She reports feeling angry with herself for the things she said and did prior to coming to the hospital. Pt states, \"I know they were just trying to help me. I wasn't myself.\" Pt is motivated to participate in outpatient substance abuse treatment and resume therapy. Pt processed thoughts and feelings. Writer validated feelings and acknowledged strengths. Pt was encouraged to focus on the things that are going well in her life. Pt was receptive. An inpatient level of care is still needed to coordinate a safe discharge plan.     LOS:  2  Expected LOS: Hearing Friday    Insurance info/prescription coverage:  [employer]  Date of last family contact:  2/21/24  Family requesting physician contact today:  No  Discharge plan:  Refer to [facility] in [place]  Guns in the home:  No  Outpatient provider(s):  [facility]    Participating treatment team members: ALICIA Narvaez MSW

## 2024-02-22 NOTE — BH NOTE
Nursing Note    Patient is alert and oriented x 3.  She denies any SI/HI/AH/VH.  Patient denies any anxiety or depression.  Broad affect and elevated mood.  Patient seen participating in music group.  She voiced no complaints and accepted her medications without difficulties.  Staff will continue to monitor patient for safety.

## 2024-02-22 NOTE — GROUP NOTE
Group Therapy Note    Date: 2/22/2024    Group Start Time: 1315  Group End Time: 1400  Group Topic: Process Group - Inpatient    SSR 2 [facility] ACUTE    Sonia Calabrese MSW        Group Therapy Note: Facilitator encouraged the group to identify needs and discussed SMART goals as a method to ensure their needs are met. Conducted CBT to manage thoughts, emotions and behaviors.     Attendees: 3       Patient's Goal:  Pt did not attend group.      Signature:  SANA MENDEZ

## 2024-02-22 NOTE — PLAN OF CARE
Problem: Inessa  Goal: Will exhibit normal sleep and speech and no impulsivity  Description: INTERVENTIONS:  1. Administer medication as ordered  2. Set limits on impulsive behavior  3. Make attempts to decrease external stimuli as possible  2/22/2024 0843 by Shonda Gabriel RN  Outcome: Progressing  2/21/2024 2028 by Trent Tong RN  Outcome: Not Progressing     Problem: Psychosis  Goal: Will report no hallucinations or delusions  Description: INTERVENTIONS:  1. Administer medication as  ordered  2. Assist with reality testing to support increasing orientation  3. Assess if patient's hallucinations or delusions are encouraging self harm or harm to others and intervene as appropriate  2/22/2024 0843 by Shonda Gabriel RN  Outcome: Progressing  2/21/2024 2028 by Trent Tong RN  Outcome: Not Progressing     Problem: Behavior  Goal: Pt/Family maintain appropriate behavior and adhere to behavioral management agreement, if implemented  Description: INTERVENTIONS:  1. Assess patient/family's coping skills and  non-compliant behavior (including use of illegal substances)  2. Notify security of behavior or suspected illegal substances which indicate the need for search of the family and/or belongings  3. Encourage verbalization of thoughts and concerns in a socially appropriate manner  4. Utilize positive, consistent limit setting strategies supporting safety of patient, staff and others  5. Encourage participation in the decision making process about the behavioral management agreement  6. If a visitor's behavior poses a threat to safety call refer to organization policy.  7. Initiate consult with , Psychosocial CNS, Spiritual Care as appropriate  2/22/2024 0843 by Shonda Gabriel RN  Outcome: Progressing  2/21/2024 2028 by Trent Tong RN  Outcome: Not Progressing     Problem: Anxiety  Goal: Will report anxiety at manageable levels  Description: INTERVENTIONS:  1.  Administer medication as ordered  2. Teach and rehearse alternative coping skills  3. Provide emotional support with 1:1 interaction with staff  2/22/2024 0843 by Shonda Gabriel RN  Outcome: Progressing  Flowsheets (Taken 2/22/2024 0842)  Will report anxiety at manageable levels:   Administer medication as ordered   Teach and rehearse alternative coping skills  2/21/2024 2028 by Trent Tong RN  Outcome: Not Progressing     Problem: Discharge Planning  Goal: Discharge to home or other facility with appropriate resources  2/21/2024 2028 by Trent Tong RN  Outcome: Not Progressing     Problem: Inessa  Goal: Will exhibit normal sleep and speech and no impulsivity  Description: INTERVENTIONS:  1. Administer medication as ordered  2. Set limits on impulsive behavior  3. Make attempts to decrease external stimuli as possible  2/22/2024 0843 by Shonda Gabriel RN  Outcome: Progressing  2/21/2024 2028 by Trent Tong RN  Outcome: Not Progressing     Problem: Psychosis  Goal: Will report no hallucinations or delusions  Description: INTERVENTIONS:  1. Administer medication as  ordered  2. Assist with reality testing to support increasing orientation  3. Assess if patient's hallucinations or delusions are encouraging self harm or harm to others and intervene as appropriate  2/22/2024 0843 by Shonda Gabriel RN  Outcome: Progressing  2/21/2024 2028 by Trent Tong RN  Outcome: Not Progressing     Problem: Behavior  Goal: Pt/Family maintain appropriate behavior and adhere to behavioral management agreement, if implemented  Description: INTERVENTIONS:  1. Assess patient/family's coping skills and  non-compliant behavior (including use of illegal substances)  2. Notify security of behavior or suspected illegal substances which indicate the need for search of the family and/or belongings  3. Encourage verbalization of thoughts and concerns in a socially appropriate manner  4. Utilize

## 2024-02-22 NOTE — GROUP NOTE
Group Therapy Note    Date: 2/21/2024    Group Start Time: 1945  Group End Time: 2030  Group Topic: Recreational    SSR 2 BH NON ACUTE    Cady Langston        Group Therapy Note    Attendees: 6/7     Recreational Therapist facilitated structured leisure skills group to introduce healthy leisure skills as positive way to cope and manage mood       Patient's Goal:  Attend groups daily     Notes:Attended group and listened to songs with peers.  Pt was receptive to intervention and responded to prompts from staff.    Status After Intervention:  Improved    Participation Level: Active Listener    Participation Quality: Appropriate      Speech:  normal      Thought Process/Content: Logical      Affective Functioning: Congruent      Mood:  calm       Level of consciousness:  Alert      Response to Learning: Progressing to goal      Endings: None Reported    Modes of Intervention: Activity      Discipline Responsible: Recreational Therapist      Signature:  SHELDON Rodriguez

## 2024-02-22 NOTE — GROUP NOTE
Group Therapy Note    Date: 2/22/2024    Group Start Time: 1720  Group End Time: 1810  Group Topic: Recreational    SSR 2  NON ACUTE    Cesilia Peoples        Group Therapy Note    Facilitated leisure skills group to reinforce positive coping and to manage mood through music, social interaction, group activities and art task      Attendees: 5/6       Patient's Goal:  Attend group daily     Notes:  Pt was receptive to listening to music and songs she selected. Interacted with peers and staff. Declined to work on leisure task      Status After Intervention:  Improved    Participation Level: Active Listener and Interactive    Participation Quality: Appropriate and Attentive      Speech:  normal      Thought Process/Content: Logical      Affective Functioning: Congruent      Mood:  Calm      Level of consciousness:  Alert      Response to Learning: Progressing to goal      Endings: None Reported    Modes of Intervention: Socialization and Activity      Discipline Responsible: Recreational Therapist      Signature:  SHELDON Thurman

## 2024-02-22 NOTE — PLAN OF CARE
Problem: Discharge Planning  Goal: Discharge to home or other facility with appropriate resources  Outcome: Not Progressing     Problem: Inessa  Goal: Will exhibit normal sleep and speech and no impulsivity  Description: INTERVENTIONS:  1. Administer medication as ordered  2. Set limits on impulsive behavior  3. Make attempts to decrease external stimuli as possible  2/21/2024 2028 by Trent Tong RN  Outcome: Not Progressing  2/21/2024 1053 by Shonda Gabriel RN  Outcome: Progressing     Problem: Psychosis  Goal: Will report no hallucinations or delusions  Description: INTERVENTIONS:  1. Administer medication as  ordered  2. Assist with reality testing to support increasing orientation  3. Assess if patient's hallucinations or delusions are encouraging self harm or harm to others and intervene as appropriate  2/21/2024 2028 by Trent Tong RN  Outcome: Not Progressing  2/21/2024 1053 by Shonda Gabriel RN  Outcome: Progressing     Problem: Behavior  Goal: Pt/Family maintain appropriate behavior and adhere to behavioral management agreement, if implemented  Description: INTERVENTIONS:  1. Assess patient/family's coping skills and  non-compliant behavior (including use of illegal substances)  2. Notify security of behavior or suspected illegal substances which indicate the need for search of the family and/or belongings  3. Encourage verbalization of thoughts and concerns in a socially appropriate manner  4. Utilize positive, consistent limit setting strategies supporting safety of patient, staff and others  5. Encourage participation in the decision making process about the behavioral management agreement  6. If a visitor's behavior poses a threat to safety call refer to organization policy.  7. Initiate consult with , Psychosocial CNS, Spiritual Care as appropriate  2/21/2024 2028 by Trent Tong RN  Outcome: Not Progressing  2/21/2024 1053 by Shonda Gabriel  RN  Outcome: Progressing     Problem: Anxiety  Goal: Will report anxiety at manageable levels  Description: INTERVENTIONS:  1. Administer medication as ordered  2. Teach and rehearse alternative coping skills  3. Provide emotional support with 1:1 interaction with staff  Outcome: Not Progressing

## 2024-02-22 NOTE — BH NOTE
Patient continues to exhibit much attention seeking and labile behavior. She can be re-directed but is manipulative and unable to manage her emotions. She requested to move to the front unit and was denied as her behavior on yesterday shift necessitated IM injections, she awaits her TDO hearing tomorrow and her behavior is not acceptable for non-acute unit. Emphasize to patient to focus on her treatment, and not the unit location where she is roomed. Patient is assured of safety and ongoing care. Q15 minute checks maintained for safety.

## 2024-02-22 NOTE — GROUP NOTE
Group Therapy Note    Date: 2/22/2024    Group Start Time: 1115  Group End Time: 1155  Group Topic: Education Group - Inpatient    SSR 2  NON ACUTE    Cesilia Peoples        Group Therapy Note    Facilitated discussion focused on defining and sharing examples of different types of automatic negative thoughts and how they affect moods and behaviors       Attendees: 4/7      Notes:  Pt came to group for less than 5 minutes then left. Did not return    Discipline Responsible: Recreational Therapist      Signature:  SHELDON Thurman

## 2024-02-23 VITALS
BODY MASS INDEX: 47.82 KG/M2 | RESPIRATION RATE: 17 BRPM | DIASTOLIC BLOOD PRESSURE: 58 MMHG | HEART RATE: 79 BPM | WEIGHT: 287 LBS | TEMPERATURE: 97.9 F | HEIGHT: 65 IN | SYSTOLIC BLOOD PRESSURE: 112 MMHG | OXYGEN SATURATION: 99 %

## 2024-02-23 PROCEDURE — 6370000000 HC RX 637 (ALT 250 FOR IP): Performed by: PSYCHIATRY & NEUROLOGY

## 2024-02-23 RX ORDER — HYDROXYZINE 50 MG/1
50 TABLET, FILM COATED ORAL 2 TIMES DAILY PRN
Qty: 60 TABLET | Refills: 0 | Status: SHIPPED | OUTPATIENT
Start: 2024-02-23 | End: 2024-03-24

## 2024-02-23 RX ORDER — ATORVASTATIN CALCIUM 10 MG/1
10 TABLET, FILM COATED ORAL NIGHTLY
Qty: 30 TABLET | Refills: 0 | Status: SHIPPED | OUTPATIENT
Start: 2024-02-23

## 2024-02-23 RX ORDER — OXCARBAZEPINE 300 MG/1
300 TABLET, FILM COATED ORAL 2 TIMES DAILY
Qty: 60 TABLET | Refills: 0 | Status: SHIPPED | OUTPATIENT
Start: 2024-02-23

## 2024-02-23 RX ORDER — QUETIAPINE 300 MG/1
300 TABLET, FILM COATED, EXTENDED RELEASE ORAL EVERY EVENING
Qty: 30 TABLET | Refills: 0 | Status: SHIPPED | OUTPATIENT
Start: 2024-02-23

## 2024-02-23 RX ORDER — TRAZODONE HYDROCHLORIDE 50 MG/1
50 TABLET ORAL NIGHTLY PRN
Qty: 30 TABLET | Refills: 0 | Status: SHIPPED | OUTPATIENT
Start: 2024-02-23

## 2024-02-23 RX ORDER — NICOTINE 21 MG/24HR
1 PATCH, TRANSDERMAL 24 HOURS TRANSDERMAL DAILY
Qty: 30 PATCH | Refills: 3 | Status: SHIPPED | OUTPATIENT
Start: 2024-02-24

## 2024-02-23 RX ORDER — SERTRALINE HYDROCHLORIDE 100 MG/1
100 TABLET, FILM COATED ORAL DAILY
Qty: 30 TABLET | Refills: 0 | Status: SHIPPED | OUTPATIENT
Start: 2024-02-24

## 2024-02-23 RX ADMIN — CHLORPROMAZINE HYDROCHLORIDE 50 MG: 50 TABLET, FILM COATED ORAL at 14:45

## 2024-02-23 RX ADMIN — CHLORPROMAZINE HYDROCHLORIDE 50 MG: 50 TABLET, FILM COATED ORAL at 08:10

## 2024-02-23 RX ADMIN — METFORMIN HYDROCHLORIDE 850 MG: 850 TABLET ORAL at 08:10

## 2024-02-23 RX ADMIN — OXCARBAZEPINE 300 MG: 300 TABLET, FILM COATED ORAL at 08:10

## 2024-02-23 RX ADMIN — SERTRALINE HYDROCHLORIDE 100 MG: 50 TABLET ORAL at 08:10

## 2024-02-23 NOTE — BH NOTE
DISCHARGE SUMMARY    NAME:Zeynep Sparks  : 1995  MRN: [id number]    The patient Zeynep Sparks exhibits the ability to control behavior in a less restrictive environment.  Patient's level of functioning is improving.  No assaultive/destructive behavior has been observed for the past 24 hours.  No suicidal/homicidal threat or behavior has been observed for the past 24 hours.  There is no evidence of serious medication side effects.  Patient has not been in physical or protective restraints for at least the past 24 hours.    If weapons involved, how are they secured? N/a    Is patient aware of and in agreement with discharge plan? Yes    Arrangements for medication:  Prescriptions to pharmacy in chart    Copy of discharge instructions to provider?:  yes    Arrangements for transportation home:  Pt's adopted mother, Wanda will pick her up. [phone]    Keep all follow up appointments as scheduled, continue to take prescribed medications per physician instructions.  Mental health crisis number:  [phone]      Mental Health Emergency WARM LINE      [phone] ([phone])      M-F: 9am to 9pm      Sat & Sun: 5pm - 9pm  National suicide prevention lines:                             9-386-EPUJJJK ([phone])       [phone] ([phone])    Crisis Text Line:  Text HOME to 019505

## 2024-02-23 NOTE — GROUP NOTE
Group Therapy Note    Date: 2/23/2024    Group Start Time: 1300  Group End Time: 1330  Group Topic: Process Group - Inpatient    SSR 2 BEHA [facility]    Joellen Bradshaw        Group Therapy Note: This writer facilitated a group where the emotion of \"anxiety\" was discussed along with it's triggers and positive ways to cope.     Attendees: 1       Patient's Goal:  to attend groups    Notes:  Pt was meeting with the hearing team at that time.       Signature:  Joellen Bradshaw

## 2024-02-23 NOTE — BH NOTE
Pt.has been discharged to home with a family member,pt.has all personal belongings, discharge instructions for follow up.prescriptions sent to [facility].pt.verbalized understanding of these instructions,pt.escorted off unit via staff to be transported home,

## 2024-02-23 NOTE — BH NOTE
Medications  (Data obtained from the EMR)    Estimated Body Mass Index  Estimated body mass index is 47.76 kg/m² as calculated from the following:    Height as of this encounter: 1.651 m (5' 5\").    Weight as of this encounter: 130.2 kg (287 lb).     Vital Signs/Blood Pressure  BP (!) 112/58   Pulse 79   Temp 97.9 °F (36.6 °C) (Oral)   Resp 17   Ht 1.651 m (5' 5\")   Wt 130.2 kg (287 lb)   SpO2 99%   BMI 47.76 kg/m²     Blood Glucose/Hemoglobin A1c  No results found for: \"GLU\", \"GLUCPOC\"    No results found for: \"HBA1C\"     Lipid Panel  No results found for: \"CHOL\", \"CHOLX\", \"CHLST\", \"CHOLV\", \"541978\", \"HDL\", \"HDLC\", \"LDL\", \"LDLC\", \"TGLX\", \"TRIGL\"     Discharge Diagnosis: Hallucinations [R44.3]  Medical clearance for psychiatric admission [Z00.8]  Bipolar disorder with psychotic features (HCC) [F31.9]  Bipolar 1 disorder (HCC) [F31.9]    Discharge Plan: Pt is referred to [facility] in [place], [place]    Discharge Medication List and Instructions:      Medication List        START taking these medications      hydrOXYzine HCl 50 MG tablet  Commonly known as: ATARAX  Take 1 tablet by mouth 2 times daily as needed for Anxiety     nicotine 21 MG/24HR  Commonly known as: NICODERM CQ  Place 1 patch onto the skin daily  Start taking on: February 24, 2024     OXcarbazepine 300 MG tablet  Commonly known as: TRILEPTAL  Take 1 tablet by mouth 2 times daily            CHANGE how you take these medications      sertraline 100 MG tablet  Commonly known as: ZOLOFT  Take 1 tablet by mouth daily  Start taking on: February 24, 2024  What changed: when to take this            CONTINUE taking these medications      atorvastatin 10 MG tablet  Commonly known as: LIPITOR  Take 1 tablet by mouth nightly     cariprazine hcl 3 MG Caps capsule  Commonly known as: VRAYLAR  Take 1 capsule by mouth daily     metFORMIN 850 MG tablet  Commonly known as: GLUCOPHAGE  Take 1 tablet by mouth 2 times daily (with meals)    the following for smoking cessation with an appointment? Not applicable    Patient was offered medication to assist with smoking cessation at discharge? Not applicable  Was education for smoking cessation added to the discharge instructions? Not applicable    Alcohol/Substance Abuse Discharge Plan:   Does the patient have a history of substance/alcohol abuse and requires a referral for treatment? Yes  Patient referred to the following for substance/alcohol abuse treatment with an appointment? Yes  Patient was offered medication to assist with alcohol cessation at discharge? Not applicable  Was education for substance/alcohol abuse added to discharge instructions? Not applicable    Patient discharged to Home/Self Care. Discharge information discussed with patient/caregiver

## 2024-02-23 NOTE — BH NOTE
Pt has been calm and cooperative this shift. Pt remains medication and group compliant. Pt stated that she just wants to leave. Pt denied Depression, Anxiety, SI/HI. Pt stated that she received a call from her family stating that her mother was on a ventilator with only weeks to live, then came to the nursing station stating that her mother was on ventilator but was doing better. Pt encouraged to talk to family to figure out what is going on with her family. Pt is currently resting in her room with eyes closed and respirations even and unlabored. Staff will continue to monitor.

## 2024-02-23 NOTE — GROUP NOTE
Group Therapy Note    Date: 2/23/2024    Group Start Time: 1120  Group End Time: 1155  Group Topic: Education Group - Inpatient    SSR 2  NON ACUTE    Cesliia Peoples        Group Therapy Note    Facilitated discussion on stress exploration focused on being able to identify daily hassles, major life changes and life circumstances that contribute to stress and identify daily uplifts, healthy coping strategies and protective factors that counteract stress       Attendees: 4/6       Patient's Goal:  Attend group daily     Notes: Pt was receptive to information for a few minutes then left group. Came back and stayed for about 5 minutes then left again. Did not return    Status After Intervention:  Improved    Participation Level: Minimal    Participation Quality: Appropriate      Speech:  normal      Thought Process/Content: Logical      Affective Functioning: Flat      Mood:  Calm      Level of consciousness:  Preoccupied      Response to Learning: Progressing to goal      Endings: None Reported    Modes of Intervention: Education and Support      Discipline Responsible: Recreational Therapist      Signature:  SHELDON Thurman

## 2024-02-23 NOTE — BH NOTE
Pt.is up and visible on unit, labile, denies feeling suicidal.attention seeking, states her mom is in [place] on a vent and in a coma and she talked to her on phone.also states she wants to go to [facility]. Denies hallucinations, accepts medications as prescribed.no concerns voiced,remains on close observation.

## 2024-02-23 NOTE — PROGRESS NOTES
PSYCHIATRIC PROGRESS NOTE         Patient Name  Zeynep Sparks   Date of Birth 1995   [facility] [id number]   Medical Record Number  [id number]      Age  28 y.o.   PCP None, None   Admit date:  2/20/2024    Room Number  238/02   [facility]   Date of Service  2/22/2024            HISTORY OF PRESENT ILLNESS/INTERVAL HISTORY:              MENTAL STATUS EXAM & VITALS                    VITALS:     Patient Vitals for the past 24 hrs:   Temp Pulse Resp BP SpO2   02/22/24 1902 98.1 °F (36.7 °C) 96 20 (!) 132/94 99 %   02/22/24 0705 97.9 °F (36.6 °C) 81 19 122/76 --   02/22/24 0659 97.9 °F (36.6 °C) 81 19 122/76 --     Wt Readings from Last 3 Encounters:   02/20/24 130.2 kg (287 lb)   02/09/24 136.1 kg (300 lb)     Temp Readings from Last 3 Encounters:   02/22/24 98.1 °F (36.7 °C) (Oral)   02/15/24 97.9 °F (36.6 °C) (Oral)     BP Readings from Last 3 Encounters:   02/22/24 (!) 132/94   02/15/24 (!) 144/77     Pulse Readings from Last 3 Encounters:   02/22/24 96   02/15/24 91            DATA     LABORATORY DATA:(reviewed/updated 2/22/2024)  Recent Results (from the past 24 hour(s))   Basic Metabolic Panel    Collection Time: 02/22/24 11:08 AM   Result Value Ref Range    Sodium 139 136 - 145 mmol/L    Potassium 3.6 3.5 - 5.1 mmol/L    Chloride 109 (H) 97 - 108 mmol/L    CO2 29 21 - 32 mmol/L    Anion Gap 1 (L) 5 - 15 mmol/L    Glucose 109 (H) 65 - 100 mg/dL    BUN 15 6 - 20 mg/dL    Creatinine 0.73 0.55 - 1.02 mg/dL    Bun/Cre Ratio 21 (H) 12 - 20      Est, Glom Filt Rate >60 >60 ml/min/1.73m2    Calcium 9.0 8.5 - 10.1 mg/dL      No results found for: \"VALAC\", \"VALP\", \"CARB2\"  No results found for: \"LITHM\"   RADIOLOGY REPORTS:(reviewed/updated 2/22/2024)  No results found.       MEDICATIONS     ALL MEDICATIONS:   Current Facility-Administered Medications   Medication Dose Route Frequency    [START ON 2/23/2024] nicotine (NICODERM CQ) 21 MG/24HR 1 patch  1 patch TransDERmal Daily    chlorproMAZINE (THORAZINE) 
PSYCHIATRIC PROGRESS NOTE         Patient Name  Zeynep Sparks   Date of Birth 1995   [facility] [id number]   Medical Record Number  [id number]      Age  28 y.o.   PCP None, None   Admit date:  2/20/2024    Room Number  237/01   [facility]   Date of Service  2/21/2024            HISTORY OF PRESENT ILLNESS/INTERVAL HISTORY:              MENTAL STATUS EXAM & VITALS                    VITALS:     Patient Vitals for the past 24 hrs:   Temp Pulse Resp BP SpO2   02/21/24 1913 98.5 °F (36.9 °C) 82 18 131/87 97 %   02/21/24 0702 98.6 °F (37 °C) 84 17 116/68 --     Wt Readings from Last 3 Encounters:   02/20/24 130.2 kg (287 lb)   02/09/24 136.1 kg (300 lb)     Temp Readings from Last 3 Encounters:   02/21/24 98.5 °F (36.9 °C) (Oral)   02/15/24 97.9 °F (36.6 °C) (Oral)     BP Readings from Last 3 Encounters:   02/21/24 131/87   02/15/24 (!) 144/77     Pulse Readings from Last 3 Encounters:   02/21/24 82   02/15/24 91            DATA     LABORATORY DATA:(reviewed/updated 2/21/2024)  Recent Results (from the past 24 hour(s))   Basic Metabolic Panel    Collection Time: 02/21/24  9:40 AM   Result Value Ref Range    Sodium 138 136 - 145 mmol/L    Potassium 3.3 (L) 3.5 - 5.1 mmol/L    Chloride 108 97 - 108 mmol/L    CO2 29 21 - 32 mmol/L    Anion Gap 1 (L) 5 - 15 mmol/L    Glucose 92 65 - 100 mg/dL    BUN 10 6 - 20 mg/dL    Creatinine 0.68 0.55 - 1.02 mg/dL    Bun/Cre Ratio 15 12 - 20      Est, Glom Filt Rate >60 >60 ml/min/1.73m2    Calcium 8.3 (L) 8.5 - 10.1 mg/dL      No results found for: \"VALAC\", \"VALP\", \"CARB2\"  No results found for: \"LITHM\"   RADIOLOGY REPORTS:(reviewed/updated 2/21/2024)  No results found.       MEDICATIONS     ALL MEDICATIONS:   Current Facility-Administered Medications   Medication Dose Route Frequency    chlorproMAZINE (THORAZINE) tablet 50 mg  50 mg Oral TID    acetaminophen (TYLENOL) tablet 650 mg  650 mg Oral Q4H PRN    hydrOXYzine HCl (ATARAX) tablet 50 mg  50 mg Oral TID PRN    
tablet 50 mg  50 mg Oral TID    acetaminophen (TYLENOL) tablet 650 mg  650 mg Oral Q4H PRN    hydrOXYzine HCl (ATARAX) tablet 50 mg  50 mg Oral TID PRN    traZODone (DESYREL) tablet 50 mg  50 mg Oral Nightly PRN    magnesium hydroxide (MILK OF MAGNESIA) 400 MG/5ML suspension 30 mL  30 mL Oral Daily PRN    aluminum & magnesium hydroxide-simethicone (MAALOX) 200-200-20 MG/5ML suspension 30 mL  30 mL Oral Q6H PRN    atorvastatin (LIPITOR) tablet 10 mg  10 mg Oral Nightly    metFORMIN (GLUCOPHAGE) tablet 850 mg  850 mg Oral BID WC    QUEtiapine (SEROQUEL XR) 150 MG extended release tablet 300 mg  300 mg Oral Nightly    sertraline (ZOLOFT) tablet 100 mg  100 mg Oral Daily    ziprasidone (GEODON) capsule 20 mg  20 mg Oral Q12H PRN    ziprasidone (GEODON) injection 20 mg  20 mg IntraMUSCular Q12H PRN    OXcarbazepine (TRILEPTAL) tablet 300 mg  300 mg Oral BID      SCHEDULED MEDICATIONS:    [START ON 2/23/2024] nicotine  1 patch TransDERmal Daily    chlorproMAZINE  50 mg Oral TID    atorvastatin  10 mg Oral Nightly    metFORMIN  850 mg Oral BID WC    QUEtiapine  300 mg Oral Nightly    sertraline  100 mg Oral Daily    OXcarbazepine  300 mg Oral BID           ASSESSMENT & PLAN     Continue close observation  Discussed meds  Provided support, psycho edu  Discussed with staff in the treatment team, the progress made in therapy, psychosocial needs and nursing concerns.    The following regarding medications was addressed during rounds with patient:   the risks and benefits of the proposed medication.   The patient was given the opportunity to ask questions.   Informed consent given to the use of the above medications.     Obtain psychiatric records from previous [facility] hospitals to further elucidate the nature of patient's psychopathology and review once available.    Gather additional collateral information from friends, family and o/p treatment team to further elucidate the nature of patient's psychopathology and baselline